# Patient Record
Sex: MALE | Race: WHITE | NOT HISPANIC OR LATINO | Employment: UNEMPLOYED | URBAN - NONMETROPOLITAN AREA
[De-identification: names, ages, dates, MRNs, and addresses within clinical notes are randomized per-mention and may not be internally consistent; named-entity substitution may affect disease eponyms.]

---

## 2017-01-23 ENCOUNTER — OFFICE VISIT (OUTPATIENT)
Dept: CARDIOLOGY | Facility: CLINIC | Age: 62
End: 2017-01-23

## 2017-01-23 VITALS
DIASTOLIC BLOOD PRESSURE: 80 MMHG | WEIGHT: 258 LBS | HEIGHT: 72 IN | HEART RATE: 64 BPM | SYSTOLIC BLOOD PRESSURE: 130 MMHG | BODY MASS INDEX: 34.95 KG/M2

## 2017-01-23 DIAGNOSIS — I25.9 IHD (ISCHEMIC HEART DISEASE): ICD-10-CM

## 2017-01-23 DIAGNOSIS — I47.29 PAROXYSMAL VT (HCC): ICD-10-CM

## 2017-01-23 DIAGNOSIS — I25.10 CORONARY ARTERY DISEASE INVOLVING NATIVE CORONARY ARTERY OF NATIVE HEART WITHOUT ANGINA PECTORIS: ICD-10-CM

## 2017-01-23 DIAGNOSIS — I51.9 LV DYSFUNCTION: Primary | ICD-10-CM

## 2017-01-23 DIAGNOSIS — Z72.0 TOBACCO ABUSE: ICD-10-CM

## 2017-01-23 DIAGNOSIS — R06.02 SHORTNESS OF BREATH: ICD-10-CM

## 2017-01-23 DIAGNOSIS — I20.9 ANGINA PECTORIS (HCC): ICD-10-CM

## 2017-01-23 DIAGNOSIS — I10 ESSENTIAL HYPERTENSION: ICD-10-CM

## 2017-01-23 PROBLEM — E03.9 HYPOTHYROID: Status: ACTIVE | Noted: 2017-01-23

## 2017-01-23 PROBLEM — K21.9 GERD (GASTROESOPHAGEAL REFLUX DISEASE): Status: ACTIVE | Noted: 2017-01-23

## 2017-01-23 PROCEDURE — 99214 OFFICE O/P EST MOD 30 MIN: CPT | Performed by: NURSE PRACTITIONER

## 2017-01-23 PROCEDURE — 93000 ELECTROCARDIOGRAM COMPLETE: CPT | Performed by: NURSE PRACTITIONER

## 2017-01-23 RX ORDER — SPIRONOLACTONE 25 MG/1
25 TABLET ORAL DAILY
Qty: 30 TABLET | Refills: 8 | Status: SHIPPED | OUTPATIENT
Start: 2017-01-23 | End: 2017-02-22

## 2017-01-23 RX ORDER — EZETIMIBE 10 MG/1
10 TABLET ORAL DAILY
Qty: 30 TABLET | Refills: 8 | Status: SHIPPED | OUTPATIENT
Start: 2017-01-23 | End: 2017-02-22

## 2017-01-23 RX ORDER — RANOLAZINE 1000 MG/1
1000 TABLET, EXTENDED RELEASE ORAL 2 TIMES DAILY
Qty: 60 TABLET | Refills: 8 | Status: SHIPPED | OUTPATIENT
Start: 2017-01-23 | End: 2017-01-30 | Stop reason: DRUGHIGH

## 2017-01-23 RX ORDER — SPIRONOLACTONE 25 MG/1
25 TABLET ORAL DAILY
COMMUNITY
End: 2017-01-23 | Stop reason: SDUPTHER

## 2017-01-23 RX ORDER — SOTALOL HYDROCHLORIDE 80 MG/1
80 TABLET ORAL 2 TIMES DAILY
Qty: 60 TABLET | Refills: 8 | Status: SHIPPED | OUTPATIENT
Start: 2017-01-23 | End: 2017-02-22

## 2017-01-23 RX ORDER — CLOPIDOGREL BISULFATE 75 MG/1
75 TABLET ORAL DAILY
COMMUNITY
End: 2017-01-23 | Stop reason: SDUPTHER

## 2017-01-23 RX ORDER — SOTALOL HYDROCHLORIDE 80 MG/1
80 TABLET ORAL 2 TIMES DAILY
COMMUNITY
End: 2017-01-23 | Stop reason: SDUPTHER

## 2017-01-23 RX ORDER — ATORVASTATIN CALCIUM 20 MG/1
20 TABLET, FILM COATED ORAL DAILY
Qty: 30 TABLET | Refills: 8 | Status: SHIPPED | OUTPATIENT
Start: 2017-01-23 | End: 2017-02-22

## 2017-01-23 RX ORDER — CLOPIDOGREL BISULFATE 75 MG/1
75 TABLET ORAL DAILY
Qty: 30 TABLET | Refills: 8 | Status: SHIPPED | OUTPATIENT
Start: 2017-01-23 | End: 2017-01-26 | Stop reason: ALTCHOICE

## 2017-01-23 RX ORDER — POTASSIUM CHLORIDE 20 MEQ/1
20 TABLET, EXTENDED RELEASE ORAL DAILY
COMMUNITY
End: 2017-08-15 | Stop reason: SDUPTHER

## 2017-01-23 RX ORDER — NITROGLYCERIN 0.4 MG/1
TABLET SUBLINGUAL
Qty: 25 TABLET | Refills: 2 | Status: SHIPPED | OUTPATIENT
Start: 2017-01-23 | End: 2017-05-12 | Stop reason: SDUPTHER

## 2017-01-23 RX ORDER — BUMETANIDE 1 MG/1
1 TABLET ORAL DAILY
COMMUNITY
End: 2017-08-15 | Stop reason: SDUPTHER

## 2017-01-23 RX ORDER — RANOLAZINE 500 MG/1
500 TABLET, EXTENDED RELEASE ORAL 2 TIMES DAILY
COMMUNITY
End: 2017-01-23 | Stop reason: DRUGHIGH

## 2017-01-23 RX ORDER — ATORVASTATIN CALCIUM 20 MG/1
20 TABLET, FILM COATED ORAL DAILY
COMMUNITY
End: 2017-01-23 | Stop reason: SDUPTHER

## 2017-01-23 RX ORDER — EZETIMIBE 10 MG/1
10 TABLET ORAL DAILY
COMMUNITY
End: 2017-01-23 | Stop reason: SDUPTHER

## 2017-01-23 RX ORDER — PANTOPRAZOLE SODIUM 40 MG/1
40 TABLET, DELAYED RELEASE ORAL DAILY
COMMUNITY

## 2017-01-23 NOTE — MR AVS SNAPSHOT
Roberth Kearns   1/23/2017 2:00 PM   Office Visit    Dept Phone:  [phone]   Encounter #:  [id number]    Provider:  MIGUELITO Chamorro   Department:  [facility] CARDIOLOGY                Your Full Care Plan              Today's Medication Changes          These changes are accurate as of: 1/23/17  3:01 PM.  If you have any questions, ask your nurse or doctor.               Medication(s)that have changed:     nitroglycerin 0.4 MG SL tablet   Commonly known as:  NITROSTAT   1 under the tongue as needed for angina, may repeat q5mins for up three doses   What changed:  Another medication with the same name was removed. Continue taking this medication, and follow the directions you see here.   Changed by:  MIGUELITO Chamorro       ranolazine 1000 MG 12 hr tablet   Commonly known as:  RANEXA   Take 1 tablet by mouth 2 (Two) Times a Day for 30 days.   What changed:    - medication strength  - how much to take   Changed by:  MIGUELITO Chamorro            Where to Get Your Medications      These medications were sent to [address] [phone] [place] [id number] [address] [address]     Phone:  [phone]     atorvastatin 20 MG tablet    clopidogrel 75 MG tablet    ezetimibe 10 MG tablet    nitroglycerin 0.4 MG SL tablet    ranolazine 1000 MG 12 hr tablet    sotalol 80 MG tablet    spironolactone 25 MG tablet                  Your Updated Medication List          This list is accurate as of: 1/23/17  3:01 PM.  Always use your most recent med list.                amLODIPine 10 MG tablet   Commonly known as:  NORVASC   TAKE 1 TABLET EACH DAY       aspirin 81 MG EC tablet       atorvastatin 20 MG tablet   Commonly known as:  LIPITOR   Take 1 tablet by mouth Daily for 30 days.       bumetanide 1 MG tablet   Commonly known as:  BUMEX       clopidogrel 75 MG tablet   Commonly known as:  PLAVIX    Take 1 tablet by mouth Daily for 30 days.       ezetimibe 10 MG tablet   Commonly known as:  ZETIA   Take 1 tablet by mouth Daily for 30 days.       fish oil 1000 MG capsule capsule       HYDROcodone-acetaminophen  MG per tablet   Commonly known as:  NORCO       levothyroxine 75 MCG tablet   Commonly known as:  SYNTHROID, LEVOTHROID       nitroglycerin 0.4 MG SL tablet   Commonly known as:  NITROSTAT   1 under the tongue as needed for angina, may repeat q5mins for up three doses       pantoprazole 40 MG EC tablet   Commonly known as:  PROTONIX       potassium chloride 20 MEQ CR tablet   Commonly known as:  K-DUR,KLOR-CON       ranolazine 1000 MG 12 hr tablet   Commonly known as:  RANEXA   Take 1 tablet by mouth 2 (Two) Times a Day for 30 days.       sotalol 80 MG tablet   Commonly known as:  BETAPACE   Take 1 tablet by mouth 2 (Two) Times a Day for 30 days.       spironolactone 25 MG tablet   Commonly known as:  ALDACTONE   Take 1 tablet by mouth Daily for 30 days.       trandolapril 4 MG tablet   Commonly known as:  MAVIK   TAKE ONE TABLET ONCE DAILY               We Performed the Following     ECG 12 Lead       You Were Diagnosed With        Codes Comments    LV dysfunction    -  Primary ICD-10-CM: I51.9  ICD-9-CM: 429.9     Coronary artery disease involving native coronary artery of native heart without angina pectoris     ICD-10-CM: I25.10  ICD-9-CM: 414.01     Essential hypertension     ICD-10-CM: I10  ICD-9-CM: 401.9     Tobacco abuse     ICD-10-CM: Z72.0  ICD-9-CM: 305.1     Paroxysmal VT     ICD-10-CM: I47.2  ICD-9-CM: 427.1     Shortness of breath     ICD-10-CM: R06.02  ICD-9-CM: 786.05     Angina pectoris     ICD-10-CM: I20.9  ICD-9-CM: 413.9       Instructions     None    Patient Instructions History      Upcoming Appointments     Visit Type Date Time Department    FOLLOW UP 1/23/2017  2:00 PM [facility]    FOLLOW UP 7/25/2017  1:45 PM [facility] Watson Signup     Our  "records indicate that you have declined [facility] signup. If you would like to sign up for [employer], please email [email or web address] or call [phone] to obtain an activation code.             Other Info from Your Visit           Your Appointments     Jul 25, 2017  1:45 PM EDT   Follow Up with MIGUELITO Sanford   [facility] CARDIOLOGY (--)    [address] [place] [address] [phone]           Arrive 15 minutes prior to appointment.              Allergies     No Known Allergies      Reason for Visit     Follow-up Patient states having some dull pain to left chest area that comes and goes, he states notes more with diet, things that is high in sodium.     Dizziness Has occ dizziness while lying in bed.    Shortness of Breath He states is about the same, nothing new.  PCP manages labs.     Med Refill Needs refills on cardiac medication.      Vital Signs     Blood Pressure Pulse Height Weight Body Mass Index Smoking Status    130/80 (BP Location: Left arm) 64 72\" (182.9 cm) 258 lb (117 kg) 34.99 kg/m2 Current Every Day Smoker      Problems and Diagnoses Noted     Coronary heart disease    Acid reflux disease    High blood pressure    Underactive thyroid    LV dysfunction    Tobacco abuse        Fast heart beat        Shortness of breath        Chest pain due to insufficient blood supply to heart            "

## 2017-01-23 NOTE — PROGRESS NOTES
Chief Complaint   Patient presents with   • Follow-up     Patient states having some dull pain to left chest area that comes and goes, he states notes more with diet, things that is high in sodium.    • Dizziness     Has occ dizziness while lying in bed.   • Shortness of Breath     He states is about the same, nothing new.  PCP manages labs.    • Med Refill     Needs refills on cardiac medication.       Cardiac Complaints  dyspnea and Dizziness      Subjective   Roberth Kearns is a 61 y.o. male with a history of ischemic heart disease diagnosed in 2001 when he underwent bypass surgery followed by multiple stents with redo bypass surgery. His last stent was placed in May 2013. His Lexiscan stress test in 2015 showed a fixed defect but no ischemia. Ejection fraction at that time was 45-50%. In March of 2016, cardiac catheterization was done that showed ejection fraction was 35-40%. The grafts to the LAD and obtuse marginal were patent. The graft to the PDA was occluded. Disease of the graft to the acute marginal was noted with stent placement.  He returns today for follow up and denies any new cardiac concerns.  He does continue to have some shortness of breath, dull chest discomfort, and he continues to have dizziness while lying in bed.  Labs he reports with you, no copies available.  Cardiac refills requested.      Cardiac History  Past Surgical History   Procedure Laterality Date   • Coronary artery bypass graft  04/2001     CABG-LIMA to LAD, SVG to RCA.   • Cath lab procedure  06/2001     Cath-[place] in OM   • Cath lab procedure  01/2002     Cath-Brachy Therapy of , Stent in RCA.   • Coronary artery bypass graft  09/18/2002     Redo CABG-SVG to distal OM, SVG to PDA.   • Cath lab procedure  08/26/2004     Cath-Patent Grafts-85%LCX at [facility].   • Echo - converted  06/18/2007     Echo-EF 45, Lateral WMA.   • Cath lab procedure  06/18/2007     Cath-Same Findings. PTCA of OM.   • Converted  (historical) holter  06/28/2007     Holter-AVG HR 63 bpm, tow (3beats) runs of V-tach.   • Cardiovascular stress test  07/22/2008     Stress- 6Min, 72%THR. Anterol Lateral Infarct with [place] Infarct Ischemia.   • Echo - converted  11/03/2008     Echo-EF 45-49% Trace MR and MVP.   • Echo - converted  01/24/2013     Echo-EF 45%   • Cardiovascular stress test  01/24/2013     L. Myoview-EF 40%. AnteroLateral Infarct with PerInfarct Ischemia.   • Cath lab procedure  05/10/2013     Cath-90% SVG to OM-5.0 x 8 mm BMS.   • Converted (historical) holter  07/15/2013     Holter-AVGHR 56 BPM. 2 runs of V-tach.   • Echo - converted  09/23/2014     Echo-([facility]) EF 50%. Septal WMA.   • Cardiovascular stress test  09/22/2015     Lexiscan stress-mod fixed defect   • Echo - converted  09/22/2015     Echo-EF 45-50%, RVSP 15 mmHg, mod MR.   • Coronary angioplasty with stent placement  03/25/2016     SVG to right marginal 2.25x12 stent mid graft and 2.5x9 stent ostium; SVG to OM 3.5x8 stent   • Echo - converted  03/25/2016     EF 40%       Current Outpatient Prescriptions   Medication Sig Dispense Refill   • amLODIPine (NORVASC) 10 MG tablet TAKE 1 TABLET EACH DAY 30 tablet 6   • aspirin 81 MG EC tablet Take 81 mg by mouth daily. Decrease dose to once a day with Effient     • atorvastatin (LIPITOR) 20 MG tablet Take 1 tablet by mouth Daily for 30 days. 30 tablet 8   • bumetanide (BUMEX) 1 MG tablet Take 1 mg by mouth Daily.     • clopidogrel (PLAVIX) 75 MG tablet Take 1 tablet by mouth Daily for 30 days. 30 tablet 8   • ezetimibe (ZETIA) 10 MG tablet Take 1 tablet by mouth Daily for 30 days. 30 tablet 8   • HYDROcodone-acetaminophen (NORCO)  MG per tablet Take 1 tablet by mouth 4 (Four) Times a Day.     • levothyroxine (SYNTHROID, LEVOTHROID) 75 MCG tablet Take 75 mcg by mouth daily.     • nitroglycerin (NITROSTAT) 0.4 MG SL tablet 1 under the tongue as needed for angina, may repeat q5mins for up three doses 25 tablet 2   • Omega-3  Fatty Acids (FISH OIL) 1000 MG capsule capsule Take  by mouth 2 (two) times a day.     • pantoprazole (PROTONIX) 40 MG EC tablet Take 40 mg by mouth Daily.     • potassium chloride (K-DUR,KLOR-CON) 20 MEQ CR tablet Take 20 mEq by mouth Daily.     • sotalol (BETAPACE) 80 MG tablet Take 1 tablet by mouth 2 (Two) Times a Day for 30 days. 60 tablet 8   • spironolactone (ALDACTONE) 25 MG tablet Take 1 tablet by mouth Daily for 30 days. 30 tablet 8   • trandolapril (MAVIK) 4 MG tablet TAKE ONE TABLET ONCE DAILY 30 tablet 6   • ranolazine (RANEXA) 1000 MG 12 hr tablet Take 1 tablet by mouth 2 (Two) Times a Day for 30 days. 60 tablet 8     No current facility-administered medications for this visit.        Review of patient's allergies indicates no known allergies.    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     Social History Main Topics   • Smoking status: Current Every Day Smoker     Packs/day: 1.00     Years: 40.00   • Smokeless tobacco: Never Used   • Alcohol use Yes      Comment: social   • Drug use: No   • Sexual activity: Not on file     Other Topics Concern   • Not on file     Social History Narrative       Family History   Problem Relation Age of Onset   • Hypertension Mother    • Cancer Father    • Heart disease Other    • Hyperlipidemia Other    • Hypertension Other    • Hypertension Other        Review of Systems   Constitutional: Negative.    HENT: Negative.    Respiratory:  "Positive for shortness of breath.    Cardiovascular: Positive for chest pain.   Gastrointestinal: Negative.    Skin: Negative.    Neurological: Positive for dizziness.   Psychiatric/Behavioral: Negative.        DiabetesNo  Thyroidnormal    Objective     Visit Vitals   • /80 (BP Location: Left arm)   • Pulse 64   • Ht 72\" (182.9 cm)   • Wt 258 lb (117 kg)   • BMI 34.99 kg/m2       Physical Exam   Constitutional: He is oriented to person, place, and time. He appears well-developed and well-nourished.   HENT:   Head: Normocephalic and atraumatic.   Eyes: EOM are normal. Pupils are equal, round, and reactive to light.   Neck: Normal range of motion.   Cardiovascular: Normal rate and regular rhythm.    Murmur heard.  Pulmonary/Chest: Effort normal and breath sounds normal.   Abdominal: Soft.   Musculoskeletal: Normal range of motion.   Neurological: He is alert and oriented to person, place, and time.   Skin: Skin is warm and dry.   Psychiatric: He has a normal mood and affect.         ECG 12 Lead  Date/Time: 1/23/2017 2:56 PM  Performed by: SUNG MARQUIS  Authorized by: SUNG MARQUIS   Rhythm: sinus rhythm  BPM: 67  Clinical impression: abnormal ECG  Comments: Inverted p wave, t wave (normal QT)            Assessment/Plan     HR and BP are both stable today.  EKG done for paroxysmal VT and sotalol therapy showed a sinus rhythm with some beats with inverted p wave, t wave and normal QT.  We will continue with current dosing of sotalol 80mg BID.  Repeat cardiac workup advised with stress/echo advised since he does continue to have shortness of breath/chest pain, which he reports as worse than prior over the last two weeks.  It should be noted he was without plavix for about a week.  Labs he states with you, no copy available.  Cardiac refills sent, patient advised to increase the dose of ranexa to 1000mg BID as long has he can tolerate it.  6 month follow up advised or sooner if needed.  Smoking cessation " advised once again, at least 3 minutes spent discussing benefits and modalities to quit.      Problems Addressed this Visit        Cardiovascular and Mediastinum    LV dysfunction - Primary    Relevant Medications    ranolazine (RANEXA) 1000 MG 12 hr tablet    nitroglycerin (NITROSTAT) 0.4 MG SL tablet    sotalol (BETAPACE) 80 MG tablet    clopidogrel (PLAVIX) 75 MG tablet    Other Relevant Orders    ECG 12 Lead    CAD (coronary artery disease)    Relevant Medications    ranolazine (RANEXA) 1000 MG 12 hr tablet    nitroglycerin (NITROSTAT) 0.4 MG SL tablet    sotalol (BETAPACE) 80 MG tablet    clopidogrel (PLAVIX) 75 MG tablet    Other Relevant Orders    Adult Transthoracic Echo Complete    Stress Test With Myocardial Perfusion One Day    HTN (hypertension)    Relevant Medications    bumetanide (BUMEX) 1 MG tablet    sotalol (BETAPACE) 80 MG tablet    spironolactone (ALDACTONE) 25 MG tablet      Other Visit Diagnoses     Tobacco abuse        Relevant Orders    Adult Transthoracic Echo Complete    Stress Test With Myocardial Perfusion One Day    Paroxysmal VT        Relevant Medications    ranolazine (RANEXA) 1000 MG 12 hr tablet    nitroglycerin (NITROSTAT) 0.4 MG SL tablet    sotalol (BETAPACE) 80 MG tablet    clopidogrel (PLAVIX) 75 MG tablet    Other Relevant Orders    ECG 12 Lead    Shortness of breath        Relevant Orders    Adult Transthoracic Echo Complete    Stress Test With Myocardial Perfusion One Day    Angina pectoris        Relevant Medications    ranolazine (RANEXA) 1000 MG 12 hr tablet    nitroglycerin (NITROSTAT) 0.4 MG SL tablet    sotalol (BETAPACE) 80 MG tablet    clopidogrel (PLAVIX) 75 MG tablet    Other Relevant Orders    Adult Transthoracic Echo Complete    Stress Test With Myocardial Perfusion One Day                  Electronically signed by MIGUELITO Herrmann January 23, 2017 5:14 PM

## 2017-01-23 NOTE — LETTER
January 23, 2017     MIGUELITO Gavin  [address]  [facility] [address]    Patient: Roberth Kearns   YOB: 1955   Date of Visit: 1/23/2017       Dear MIGUELITO Diaz:    Roberth Kearns was in my office today. Below are the relevant portions of my assessment and plan of care.        HR and BP are both stable today.  EKG done for paroxysmal VT and sotalol therapy showed a sinus rhythm with some beats with inverted p wave, t wave and normal QT.  We will continue with current dosing of sotalol 80mg BID.  Repeat cardiac workup advised with stress/echo advised since he does continue to have shortness of breath/chest pain, which he reports as worse than prior over the last two weeks.  It should be noted he was without plavix for about a week.  Labs he states with you, no copy available.  Cardiac refills sent, patient advised to increase the dose of ranexa to 1000mg BID as long has he can tolerate it.  6 month follow up advised or sooner if needed.  Smoking cessation advised once again, at least 3 minutes spent discussing benefits and modalities to quit.      Problems Addressed this Visit        Cardiovascular and Mediastinum    LV dysfunction - Primary    Relevant Medications    ranolazine (RANEXA) 1000 MG 12 hr tablet    nitroglycerin (NITROSTAT) 0.4 MG SL tablet    sotalol (BETAPACE) 80 MG tablet    clopidogrel (PLAVIX) 75 MG tablet    Other Relevant Orders    ECG 12 Lead    CAD (coronary artery disease)    Relevant Medications    ranolazine (RANEXA) 1000 MG 12 hr tablet    nitroglycerin (NITROSTAT) 0.4 MG SL tablet    sotalol (BETAPACE) 80 MG tablet    clopidogrel (PLAVIX) 75 MG tablet    Other Relevant Orders    Adult Transthoracic Echo Complete    Stress Test With Myocardial Perfusion One Day    HTN (hypertension)    Relevant Medications    bumetanide (BUMEX) 1 MG tablet    sotalol (BETAPACE) 80 MG tablet    spironolactone (ALDACTONE) 25 MG tablet      Other Visit Diagnoses     Tobacco abuse         Relevant Orders    Adult Transthoracic Echo Complete    Stress Test With Myocardial Perfusion One Day    Paroxysmal VT        Relevant Medications    ranolazine (RANEXA) 1000 MG 12 hr tablet    nitroglycerin (NITROSTAT) 0.4 MG SL tablet    sotalol (BETAPACE) 80 MG tablet    clopidogrel (PLAVIX) 75 MG tablet    Other Relevant Orders    ECG 12 Lead    Shortness of breath        Relevant Orders    Adult Transthoracic Echo Complete    Stress Test With Myocardial Perfusion One Day    Angina pectoris        Relevant Medications    ranolazine (RANEXA) 1000 MG 12 hr tablet    nitroglycerin (NITROSTAT) 0.4 MG SL tablet    sotalol (BETAPACE) 80 MG tablet    clopidogrel (PLAVIX) 75 MG tablet    Other Relevant Orders    Adult Transthoracic Echo Complete    Stress Test With Myocardial Perfusion One Day                  Electronically signed by MIGUELITO Herrmann January 23, 2017 5:14 PM              If you have questions, please do not hesitate to call me. I look forward to following Roberth along with you.         Sincerely,        MIGUELITO Herrmann        CC: No Recipients

## 2017-01-26 ENCOUNTER — TELEPHONE (OUTPATIENT)
Dept: CARDIOLOGY | Facility: CLINIC | Age: 62
End: 2017-01-26

## 2017-01-26 NOTE — TELEPHONE ENCOUNTER
Pt states he is having a lot more chest pain since increasing the Ranexa to 1000 mg BID, asking if he can decrease back to 500 mg BID. Also said we had changed his Plavix to Effient 10 mg QD a long time ago but still had Plavix in the chart.Will update chart, is it ok to send refill for the Effient also?

## 2017-01-27 RX ORDER — PRASUGREL 10 MG/1
10 TABLET, FILM COATED ORAL DAILY
Qty: 30 TABLET | Refills: 9 | Status: SHIPPED | OUTPATIENT
Start: 2017-01-27 | End: 2017-06-16 | Stop reason: SDUPTHER

## 2017-01-30 RX ORDER — RANOLAZINE 500 MG/1
500 TABLET, EXTENDED RELEASE ORAL 2 TIMES DAILY
Qty: 60 TABLET | Refills: 9 | Status: SHIPPED | OUTPATIENT
Start: 2017-01-30 | End: 2017-08-15 | Stop reason: DRUGHIGH

## 2017-05-12 RX ORDER — NITROGLYCERIN 0.4 MG/1
TABLET SUBLINGUAL
Qty: 25 TABLET | Refills: 2 | Status: SHIPPED | OUTPATIENT
Start: 2017-05-12 | End: 2017-08-15 | Stop reason: SDUPTHER

## 2017-06-16 RX ORDER — PRASUGREL HCL 10 MG
TABLET ORAL
Qty: 30 TABLET | Refills: 5 | Status: SHIPPED | OUTPATIENT
Start: 2017-06-16 | End: 2017-08-15 | Stop reason: SDUPTHER

## 2017-06-16 RX ORDER — POTASSIUM CHLORIDE 20 MEQ/1
TABLET, EXTENDED RELEASE ORAL
Qty: 30 TABLET | OUTPATIENT
Start: 2017-06-16

## 2017-06-16 RX ORDER — PANTOPRAZOLE SODIUM 40 MG/1
TABLET, DELAYED RELEASE ORAL
Qty: 30 TABLET | OUTPATIENT
Start: 2017-06-16

## 2017-06-26 RX ORDER — EZETIMIBE 10 MG/1
TABLET ORAL
Qty: 30 TABLET | OUTPATIENT
Start: 2017-06-26

## 2017-06-26 RX ORDER — TRANDOLAPRIL TABLETS 4 MG/1
TABLET ORAL
Qty: 30 TABLET | Refills: 0 | Status: SHIPPED | OUTPATIENT
Start: 2017-06-26 | End: 2017-07-27 | Stop reason: SDUPTHER

## 2017-07-05 RX ORDER — BUMETANIDE 1 MG/1
TABLET ORAL
Qty: 30 TABLET | OUTPATIENT
Start: 2017-07-05

## 2017-07-18 ENCOUNTER — TELEPHONE (OUTPATIENT)
Dept: CARDIOLOGY | Facility: CLINIC | Age: 62
End: 2017-07-18

## 2017-07-18 RX ORDER — AMLODIPINE BESYLATE 10 MG/1
10 TABLET ORAL DAILY
Qty: 30 TABLET | Refills: 0 | Status: SHIPPED | OUTPATIENT
Start: 2017-07-18 | End: 2017-08-15 | Stop reason: SDUPTHER

## 2017-07-18 NOTE — TELEPHONE ENCOUNTER
Patient called requesting refill on Amlodipine 10mg daily, refill on Amlodipine 10mg daily 30 day refill sent to pharmacy.

## 2017-07-27 ENCOUNTER — TELEPHONE (OUTPATIENT)
Dept: CARDIOLOGY | Facility: CLINIC | Age: 62
End: 2017-07-27

## 2017-07-27 RX ORDER — TRANDOLAPRIL TABLETS 4 MG/1
4 TABLET ORAL DAILY
Qty: 30 TABLET | Refills: 0 | Status: SHIPPED | OUTPATIENT
Start: 2017-07-27 | End: 2017-08-15 | Stop reason: SDUPTHER

## 2017-07-27 NOTE — TELEPHONE ENCOUNTER
Patient called requesting refill on Trandolapril 4mg once daily. 30 day refill on Trandolapril 4mg daily sent to pharmacy.

## 2017-08-14 NOTE — PROGRESS NOTES
"Chief Complaint   Patient presents with   • Follow-up     6 month follow-up, labs per PCP,    • Med Refill     request 90 day refills on cardiac medication's, patient brought in copy of medication  list with visit.   • Chest Pain     \"had chest pain last few days, have started walking and would have chest pain next day\"        Subjective       Roberth Kearns is a 61 y.o. male with ischemic heart disease diagnosed in 2001 when he underwent bypass surgery followed by redo bypass surgery and multiple stentings with the last in March of 2016 when he had two stents placed in the SVG to right marginal and one stent to SVG to OM after being admitted with NSTEMI.  Last follow up visit in January, he continued to have chest discomfort.  Ranexa was increased to 1000 mg BID and he was advised to undergo stress testing.  He had some problems with insurance and was unable to complete testing.  He came in today for his regular follow up visit contnuing to have chest discomfort.  He started exercising recently, walking about 1/4 mile daily and has noticed chest tightness and sharp pain under his left breast after walking  The pain lasts a few minutes and is relieved with rest and SL nitro x 1.  He states pain is similar to what he had before his last stenting. He does have some shortness of breath but not worse than before.  He denies any significant palpitations.  He unfortunately still continues to smoke about a pack a day and doesn't think he can quit.  Lab work was apparently done by his primary care about one month ago.  He requests refills on all cardiac meds.      HPI         Cardiac History:    Past Surgical History:   Procedure Laterality Date   • CARDIAC CATHETERIZATION  06/18/2007    Cath-Same Findings. PTCA of .   • CARDIAC CATHETERIZATION  08/26/2004    Cath-Patent Grafts-85%LCX at [facility]   • CARDIAC CATHETERIZATION  01/2002    Cath-Brachy Therapy of , Stent in RCA.   • CARDIAC CATHETERIZATION  " 06/2001    Cath-Oconnor in OM   • CARDIAC CATHETERIZATION  03/25/2016    SVG to right marginal 2.25x12 stent mid graft and 2.5x9 stent ostium; SVG to OM 3.5x8 stent   • CARDIOVASCULAR STRESS TEST  07/22/2008    Stress- 6Min, 72%THR. Anterol Lateral Infarct with Yuni Infarct Ischemia.   • CARDIOVASCULAR STRESS TEST  01/24/2013    L. Myoview-EF 40%. AnteroLateral Infarct with PerInfarct Ischemia.   • CARDIOVASCULAR STRESS TEST  09/22/2015    Lexiscan stress-mod fixed defect   • CATH LAB PROCEDURE  05/10/2013    Cath-90% SVG to OM-5.0 x 8 mm BMS.   • CONVERTED (HISTORICAL) HOLTER  06/28/2007    Holter-AVG HR 63 bpm, tow (3beats) runs of V-tach.   • CONVERTED (HISTORICAL) HOLTER  07/15/2013    Holter-AVGHR 56 BPM. 2 runs of V-tach.   • CORONARY ARTERY BYPASS GRAFT  04/2001    CABG-LIMA to LAD, SVG to RCA.   • CORONARY ARTERY BYPASS GRAFT  09/18/2002    Redo CABG-SVG to distal OM, SVG to PDA.   • ECHO - CONVERTED  06/18/2007    Echo-EF 45, Lateral WMA.   • ECHO - CONVERTED  11/03/2008    Echo-EF 45-49% Trace MR and MVP.   • ECHO - CONVERTED  01/24/2013    Echo-EF 45%   • ECHO - CONVERTED  09/23/2014    Echo-([facility]) EF 50%. Septal WMA.   • ECHO - CONVERTED  09/22/2015    Echo-EF 45-50%, RVSP 15 mmHg, mod MR.   • ECHO - CONVERTED  03/25/2016    EF 40%       Current Outpatient Prescriptions   Medication Sig Dispense Refill   • amLODIPine (NORVASC) 10 MG tablet Take 1 tablet by mouth Daily. 30 tablet 0   • aspirin 81 MG EC tablet Take 81 mg by mouth daily. Decrease dose to once a day with Effient     • atorvastatin (LIPITOR) 20 MG tablet Take 1 tablet by mouth Daily. 30 tablet 6   • bumetanide (BUMEX) 1 MG tablet Take 1 tablet by mouth Daily. 30 tablet 6   • HYDROcodone-acetaminophen (NORCO)  MG per tablet Take 1 tablet by mouth 4 (Four) Times a Day.     • levothyroxine (SYNTHROID, LEVOTHROID) 75 MCG tablet Take 75 mcg by mouth daily.     • metFORMIN (GLUCOPHAGE) 500 MG tablet Take 500 mg by mouth 2 (Two) Times a Day With  Meals.     • nitroglycerin (NITROSTAT) 0.4 MG SL tablet Place 1 tablet under the tongue Every 5 (Five) Minutes As Needed for Chest Pain. Take no more than 3 doses in 15 minutes. 25 tablet 2   • Omega-3 Fatty Acids (FISH OIL) 1000 MG capsule capsule Take  by mouth 2 (two) times a day.     • pantoprazole (PROTONIX) 40 MG EC tablet Take 40 mg by mouth Daily.     • potassium chloride (K-DUR,KLOR-CON) 20 MEQ CR tablet Take 1 tablet by mouth Daily. 30 tablet 6   • prasugrel (EFFIENT) 10 MG tablet Take 1 tablet by mouth Daily. 30 tablet 6   • ranolazine (RANEXA) 1000 MG 12 hr tablet Take 1 tablet by mouth 2 (Two) Times a Day. 60 tablet 6   • sotalol (BETAPACE) 80 MG tablet Take 1 tablet by mouth 2 (Two) Times a Day. 60 tablet 6   • spironolactone (ALDACTONE) 25 MG tablet Take 1 tablet by mouth Daily. 30 tablet 6   • trandolapril (MAVIK) 4 MG tablet Take 1 tablet by mouth Daily. 30 tablet 0     No current facility-administered medications for this visit.        Review of patient's allergies indicates no known allergies.    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)     mild   • Shortness of breath    • V tach     PVC, on Sotalol       Social History     Social History   • Marital status:      Spouse name: N/A   • Number of children: N/A   • Years of education: N/A     Occupational History   • Not on file.     Social History Main Topics   • Smoking status: Current Every Day Smoker     Packs/day: 1.00     Years: 40.00   • Smokeless tobacco: Never Used      Comment: counseled patient on effect's of tobacco use on health.   • Alcohol use Yes      Comment: social   • Drug use: No   • Sexual activity: Not on file     Other Topics Concern   •  "Not on file     Social History Narrative       Family History   Problem Relation Age of Onset   • Hypertension Mother    • Cancer Father    • Heart disease Other    • Hyperlipidemia Other    • Hypertension Other    • Hypertension Other        Review of Systems   Constitution: Positive for malaise/fatigue. Negative for chills, decreased appetite and fever.   Cardiovascular: Positive for chest pain and dyspnea on exertion. Negative for leg swelling and palpitations.   Respiratory: Positive for shortness of breath. Negative for cough and wheezing.    Hematologic/Lymphatic: Negative for bleeding problem. Does not bruise/bleed easily.   Musculoskeletal: Positive for arthritis.   Gastrointestinal: Negative for abdominal pain, heartburn and nausea.   Psychiatric/Behavioral: Negative for altered mental status. The patient has insomnia. The patient is not nervous/anxious.         Diabetes- Yes  Thyroid-abnormal    Objective     /78 (BP Location: Left arm)  Pulse 60  Ht 72\" (182.9 cm)  Wt 256 lb (116 kg)  BMI 34.72 kg/m2    Physical Exam   Constitutional: He is oriented to person, place, and time. He appears well-developed and well-nourished. No distress.   HENT:   Head: Normocephalic and atraumatic.   Eyes: Pupils are equal, round, and reactive to light.   Neck: Normal range of motion. Neck supple.   Cardiovascular: Normal rate and regular rhythm.    Murmur heard.  Pulmonary/Chest: Effort normal and breath sounds normal. No respiratory distress. He has no wheezes.   Abdominal: Soft. Bowel sounds are normal.   Musculoskeletal: Normal range of motion. He exhibits no edema.   Neurological: He is alert and oriented to person, place, and time.   Skin: Skin is warm and dry. There is pallor.   Psychiatric: He has a normal mood and affect. His behavior is normal.   Nursing note and vitals reviewed.           ECG 12 Lead  Date/Time: 8/15/2017 5:20 PM  Performed by: CLARISSA BARRAGAN  Authorized by: CLARISSA BARRAGAN   Comparison: " compared with previous ECG from 1/23/2017  Rhythm: sinus bradycardia and A-V block  Rate: bradycardic  Conduction: 1st degree  Clinical impression: abnormal ECG  Comments: Normal QTc at 428 ms                  Assessment/Plan      His heart rate and blood pressure are stable today.  His EKG showed sinus efrain with first degree AV block and normal QTc.  He is advised to continue the same medications.  Refills were sent.  Labs were requested from Dr. Lobo's office for evaluation of blood count, renal function, electrolytes, especially potassium, as well as lipids.  We discussed smoking cessation in detail and patient does not feel he can quit and refused any smoking cessation tools.  His chest pain is a little concerning and now that his insurance is working, he has agreed to undergo stress testing in the form of Lexiscan to look for ischemic burden.  I also scheduled him for an echocardiogram to evaluate the LV function.  He has nitro sl on hand and is encouraged to use for CP.  If CP is not relieved by nitro, he is advised to present to the nearest emergency room.  Based on the results of the stress and echo, further recommendations will be made.  A six month follow up visit will be scheduled.          Roberth was seen today for follow-up, med refill and chest pain.    Diagnoses and all orders for this visit:    Ischemic chest pain  -     Stress Test With Myocardial Perfusion One Day; Future  -     Adult Transthoracic Echo Complete; Future  -     ECG 12 Lead    Coronary artery disease of native heart with stable angina pectoris, unspecified vessel or lesion type  -     Stress Test With Myocardial Perfusion One Day; Future  -     Adult Transthoracic Echo Complete; Future  -     ECG 12 Lead    Essential hypertension  -     Stress Test With Myocardial Perfusion One Day; Future  -     Adult Transthoracic Echo Complete; Future  -     ECG 12 Lead    LV dysfunction  -     Stress Test With Myocardial Perfusion One Day;  Future  -     Adult Transthoracic Echo Complete; Future    Pure hypercholesterolemia  -     Stress Test With Myocardial Perfusion One Day; Future  -     Adult Transthoracic Echo Complete; Future    Other orders  -     amLODIPine (NORVASC) 10 MG tablet; Take 1 tablet by mouth Daily.  -     atorvastatin (LIPITOR) 20 MG tablet; Take 1 tablet by mouth Daily.  -     bumetanide (BUMEX) 1 MG tablet; Take 1 tablet by mouth Daily.  -     prasugrel (EFFIENT) 10 MG tablet; Take 1 tablet by mouth Daily.  -     nitroglycerin (NITROSTAT) 0.4 MG SL tablet; Place 1 tablet under the tongue Every 5 (Five) Minutes As Needed for Chest Pain. Take no more than 3 doses in 15 minutes.  -     potassium chloride (K-DUR,KLOR-CON) 20 MEQ CR tablet; Take 1 tablet by mouth Daily.  -     ranolazine (RANEXA) 1000 MG 12 hr tablet; Take 1 tablet by mouth 2 (Two) Times a Day.  -     sotalol (BETAPACE) 80 MG tablet; Take 1 tablet by mouth 2 (Two) Times a Day.  -     spironolactone (ALDACTONE) 25 MG tablet; Take 1 tablet by mouth Daily.  -     trandolapril (MAVIK) 4 MG tablet; Take 1 tablet by mouth Daily.                 Electronically signed by MIGUELITO Bryant,  August 25, 2017 12:06 PM

## 2017-08-15 ENCOUNTER — OFFICE VISIT (OUTPATIENT)
Dept: CARDIOLOGY | Facility: CLINIC | Age: 62
End: 2017-08-15

## 2017-08-15 VITALS
HEIGHT: 72 IN | DIASTOLIC BLOOD PRESSURE: 78 MMHG | WEIGHT: 256 LBS | HEART RATE: 60 BPM | SYSTOLIC BLOOD PRESSURE: 130 MMHG | BODY MASS INDEX: 34.67 KG/M2

## 2017-08-15 DIAGNOSIS — I20.9 ISCHEMIC CHEST PAIN (HCC): Primary | ICD-10-CM

## 2017-08-15 DIAGNOSIS — I51.9 LV DYSFUNCTION: ICD-10-CM

## 2017-08-15 DIAGNOSIS — I25.118 CORONARY ARTERY DISEASE OF NATIVE HEART WITH STABLE ANGINA PECTORIS, UNSPECIFIED VESSEL OR LESION TYPE (HCC): ICD-10-CM

## 2017-08-15 DIAGNOSIS — E78.00 PURE HYPERCHOLESTEROLEMIA: ICD-10-CM

## 2017-08-15 DIAGNOSIS — I10 ESSENTIAL HYPERTENSION: ICD-10-CM

## 2017-08-15 PROCEDURE — 93000 ELECTROCARDIOGRAM COMPLETE: CPT | Performed by: NURSE PRACTITIONER

## 2017-08-15 PROCEDURE — 99214 OFFICE O/P EST MOD 30 MIN: CPT | Performed by: NURSE PRACTITIONER

## 2017-08-15 RX ORDER — SPIRONOLACTONE 25 MG/1
25 TABLET ORAL DAILY
Qty: 30 TABLET | Refills: 6 | Status: SHIPPED | OUTPATIENT
Start: 2017-08-15 | End: 2019-08-21 | Stop reason: DRUGHIGH

## 2017-08-15 RX ORDER — SOTALOL HYDROCHLORIDE 80 MG/1
80 TABLET ORAL 2 TIMES DAILY
COMMUNITY
End: 2017-08-15 | Stop reason: SDUPTHER

## 2017-08-15 RX ORDER — POTASSIUM CHLORIDE 20 MEQ/1
20 TABLET, EXTENDED RELEASE ORAL DAILY
Qty: 30 TABLET | Refills: 6 | Status: SHIPPED | OUTPATIENT
Start: 2017-08-15 | End: 2019-11-12 | Stop reason: ALTCHOICE

## 2017-08-15 RX ORDER — BUMETANIDE 1 MG/1
1 TABLET ORAL DAILY
Qty: 30 TABLET | Refills: 6 | Status: SHIPPED | OUTPATIENT
Start: 2017-08-15 | End: 2019-05-02 | Stop reason: ALTCHOICE

## 2017-08-15 RX ORDER — AMLODIPINE BESYLATE 10 MG/1
10 TABLET ORAL DAILY
Qty: 30 TABLET | Refills: 0 | Status: SHIPPED | OUTPATIENT
Start: 2017-08-15 | End: 2017-09-18 | Stop reason: SDUPTHER

## 2017-08-15 RX ORDER — ATORVASTATIN CALCIUM 20 MG/1
20 TABLET, FILM COATED ORAL DAILY
COMMUNITY
End: 2017-08-15 | Stop reason: SDUPTHER

## 2017-08-15 RX ORDER — RANOLAZINE 1000 MG/1
1000 TABLET, EXTENDED RELEASE ORAL 2 TIMES DAILY
Qty: 60 TABLET | Refills: 6 | Status: SHIPPED | OUTPATIENT
Start: 2017-08-15 | End: 2019-01-09 | Stop reason: SDUPTHER

## 2017-08-15 RX ORDER — SPIRONOLACTONE 25 MG/1
25 TABLET ORAL DAILY
COMMUNITY
End: 2017-08-15 | Stop reason: SDUPTHER

## 2017-08-15 RX ORDER — ATORVASTATIN CALCIUM 20 MG/1
20 TABLET, FILM COATED ORAL DAILY
Qty: 30 TABLET | Refills: 6 | Status: SHIPPED | OUTPATIENT
Start: 2017-08-15 | End: 2019-05-02 | Stop reason: ALTCHOICE

## 2017-08-15 RX ORDER — NITROGLYCERIN 0.4 MG/1
0.4 TABLET SUBLINGUAL
Qty: 25 TABLET | Refills: 2 | Status: SHIPPED | OUTPATIENT
Start: 2017-08-15 | End: 2018-03-13 | Stop reason: SDUPTHER

## 2017-08-15 RX ORDER — RANOLAZINE 1000 MG/1
1000 TABLET, EXTENDED RELEASE ORAL 2 TIMES DAILY
COMMUNITY
End: 2017-08-15 | Stop reason: SDUPTHER

## 2017-08-15 RX ORDER — PRASUGREL 10 MG/1
10 TABLET, FILM COATED ORAL DAILY
Qty: 30 TABLET | Refills: 6 | Status: SHIPPED | OUTPATIENT
Start: 2017-08-15 | End: 2019-11-12 | Stop reason: ALTCHOICE

## 2017-08-15 RX ORDER — SOTALOL HYDROCHLORIDE 80 MG/1
80 TABLET ORAL 2 TIMES DAILY
Qty: 60 TABLET | Refills: 6 | Status: SHIPPED | OUTPATIENT
Start: 2017-08-15 | End: 2019-05-15 | Stop reason: ALTCHOICE

## 2017-08-15 RX ORDER — TRANDOLAPRIL TABLETS 4 MG/1
4 TABLET ORAL DAILY
Qty: 30 TABLET | Refills: 0 | Status: SHIPPED | OUTPATIENT
Start: 2017-08-15 | End: 2017-09-26 | Stop reason: SDUPTHER

## 2017-09-18 RX ORDER — AMLODIPINE BESYLATE 10 MG/1
TABLET ORAL
Qty: 30 TABLET | Refills: 6 | Status: SHIPPED | OUTPATIENT
Start: 2017-09-18 | End: 2019-03-21 | Stop reason: DRUGHIGH

## 2017-09-26 RX ORDER — TRANDOLAPRIL TABLETS 4 MG/1
TABLET ORAL
Qty: 30 TABLET | Refills: 3 | Status: SHIPPED | OUTPATIENT
Start: 2017-09-26 | End: 2019-03-14

## 2017-09-27 RX ORDER — LEVOTHYROXINE SODIUM 0.07 MG/1
75 TABLET ORAL DAILY
Qty: 30 TABLET | Refills: 5 | Status: SHIPPED | OUTPATIENT
Start: 2017-09-27

## 2017-09-27 RX ORDER — EZETIMIBE 10 MG/1
10 TABLET ORAL DAILY
Qty: 30 TABLET | Refills: 5 | Status: SHIPPED | OUTPATIENT
Start: 2017-09-27

## 2017-10-10 ENCOUNTER — HOSPITAL ENCOUNTER (OUTPATIENT)
Dept: CARDIOLOGY | Facility: HOSPITAL | Age: 62
Discharge: HOME | End: 2017-10-10

## 2017-10-10 ENCOUNTER — OUTSIDE FACILITY SERVICE (OUTPATIENT)
Dept: CARDIOLOGY | Facility: CLINIC | Age: 62
End: 2017-10-10

## 2017-10-10 DIAGNOSIS — I25.118 CORONARY ARTERY DISEASE OF NATIVE HEART WITH STABLE ANGINA PECTORIS, UNSPECIFIED VESSEL OR LESION TYPE (HCC): ICD-10-CM

## 2017-10-10 DIAGNOSIS — I10 ESSENTIAL HYPERTENSION: ICD-10-CM

## 2017-10-10 DIAGNOSIS — I20.9 ISCHEMIC CHEST PAIN (HCC): ICD-10-CM

## 2017-10-10 DIAGNOSIS — I51.9 LV DYSFUNCTION: ICD-10-CM

## 2017-10-10 DIAGNOSIS — E78.00 PURE HYPERCHOLESTEROLEMIA: ICD-10-CM

## 2017-10-10 LAB
MAXIMAL PREDICTED HEART RATE: 160 BPM
STRESS TARGET HR: 136 BPM

## 2017-10-10 PROCEDURE — 93017 CV STRESS TEST TRACING ONLY: CPT

## 2017-10-10 PROCEDURE — 93306 TTE W/DOPPLER COMPLETE: CPT | Performed by: INTERNAL MEDICINE

## 2017-10-10 PROCEDURE — 25010000002 REGADENOSON 0.4 MG/5ML SOLUTION: Performed by: INTERNAL MEDICINE

## 2017-10-10 PROCEDURE — A9500 TC99M SESTAMIBI: HCPCS | Performed by: INTERNAL MEDICINE

## 2017-10-10 PROCEDURE — 93306 TTE W/DOPPLER COMPLETE: CPT

## 2017-10-10 PROCEDURE — 78452 HT MUSCLE IMAGE SPECT MULT: CPT

## 2017-10-10 PROCEDURE — 0 TECHNETIUM SESTAMIBI: Performed by: INTERNAL MEDICINE

## 2017-10-10 PROCEDURE — 93018 CV STRESS TEST I&R ONLY: CPT | Performed by: INTERNAL MEDICINE

## 2017-10-10 PROCEDURE — 78452 HT MUSCLE IMAGE SPECT MULT: CPT | Performed by: INTERNAL MEDICINE

## 2017-10-10 RX ADMIN — TECHNETIUM TC-99M SESTAMIBI 1 DOSE: 1 INJECTION INTRAVENOUS at 10:30

## 2017-10-10 RX ADMIN — REGADENOSON 0.4 MG: 0.08 INJECTION, SOLUTION INTRAVENOUS at 10:30

## 2017-10-17 ENCOUNTER — TELEPHONE (OUTPATIENT)
Dept: CARDIOLOGY | Facility: CLINIC | Age: 62
End: 2017-10-17

## 2017-10-18 ENCOUNTER — TELEPHONE (OUTPATIENT)
Dept: CARDIOLOGY | Facility: CLINIC | Age: 62
End: 2017-10-18

## 2017-10-18 NOTE — TELEPHONE ENCOUNTER
Patient called, he is having problems with getting refills on Effient and Bumex.    I called [facility].  These were both e-prescribed on 8/15/17 office visit along with other scripts.    I spoke with Karime, she has now found the e-prescriptions and will get ready for patient.  I also called in 30 days only of patient's Protonix.  He is having trouble getting Dr. Lobo to return his phone calls for refill.

## 2017-10-27 ENCOUNTER — OUTSIDE FACILITY SERVICE (OUTPATIENT)
Dept: CARDIOLOGY | Facility: CLINIC | Age: 62
End: 2017-10-27

## 2017-10-27 PROCEDURE — 93459 L HRT ART/GRFT ANGIO: CPT | Performed by: INTERNAL MEDICINE

## 2017-11-02 ENCOUNTER — TELEPHONE (OUTPATIENT)
Dept: CARDIOLOGY | Facility: CLINIC | Age: 62
End: 2017-11-02

## 2017-11-02 NOTE — TELEPHONE ENCOUNTER
"Patient called with concerns about with no refills on Sotalol and Effient from his pharmacy. Called pharmacy spoke with Karime concerning patients refills on Sotalol and Effient, Karime states \"he has refills, will get medication ready for \". Phoned patient back explained to patient refills are at pharmacy, patient verbalized understanding.  "

## 2018-03-13 ENCOUNTER — OFFICE VISIT (OUTPATIENT)
Dept: CARDIOLOGY | Facility: CLINIC | Age: 63
End: 2018-03-13

## 2018-03-13 VITALS
SYSTOLIC BLOOD PRESSURE: 120 MMHG | DIASTOLIC BLOOD PRESSURE: 86 MMHG | BODY MASS INDEX: 35.21 KG/M2 | WEIGHT: 260 LBS | HEIGHT: 72 IN | HEART RATE: 74 BPM

## 2018-03-13 DIAGNOSIS — R06.02 SHORTNESS OF BREATH: ICD-10-CM

## 2018-03-13 DIAGNOSIS — E03.9 ACQUIRED HYPOTHYROIDISM: ICD-10-CM

## 2018-03-13 DIAGNOSIS — I25.118 CORONARY ARTERY DISEASE OF NATIVE ARTERY OF NATIVE HEART WITH STABLE ANGINA PECTORIS (HCC): Primary | ICD-10-CM

## 2018-03-13 DIAGNOSIS — E78.00 HYPERCHOLESTEREMIA: ICD-10-CM

## 2018-03-13 DIAGNOSIS — I10 ESSENTIAL HYPERTENSION: ICD-10-CM

## 2018-03-13 DIAGNOSIS — I49.3 PVC (PREMATURE VENTRICULAR CONTRACTION): ICD-10-CM

## 2018-03-13 DIAGNOSIS — I51.9 LV DYSFUNCTION: ICD-10-CM

## 2018-03-13 DIAGNOSIS — IMO0001 CLASS 2 OBESITY DUE TO EXCESS CALORIES WITH SERIOUS COMORBIDITY AND BODY MASS INDEX (BMI) OF 35.0 TO 35.9 IN ADULT: ICD-10-CM

## 2018-03-13 DIAGNOSIS — E11.9 TYPE 2 DIABETES MELLITUS WITHOUT COMPLICATION, WITHOUT LONG-TERM CURRENT USE OF INSULIN (HCC): ICD-10-CM

## 2018-03-13 DIAGNOSIS — Z72.0 TOBACCO ABUSE: ICD-10-CM

## 2018-03-13 PROCEDURE — 93000 ELECTROCARDIOGRAM COMPLETE: CPT | Performed by: NURSE PRACTITIONER

## 2018-03-13 PROCEDURE — 99214 OFFICE O/P EST MOD 30 MIN: CPT | Performed by: NURSE PRACTITIONER

## 2018-03-13 PROCEDURE — 99406 BEHAV CHNG SMOKING 3-10 MIN: CPT | Performed by: NURSE PRACTITIONER

## 2018-03-13 RX ORDER — NITROGLYCERIN 0.4 MG/1
0.4 TABLET SUBLINGUAL
Qty: 25 TABLET | Refills: 2 | Status: SHIPPED | OUTPATIENT
Start: 2018-03-13 | End: 2019-01-22 | Stop reason: SDUPTHER

## 2018-03-13 RX ORDER — ISOSORBIDE MONONITRATE 30 MG/1
30 TABLET, EXTENDED RELEASE ORAL DAILY
Qty: 30 TABLET | Refills: 8 | Status: SHIPPED | OUTPATIENT
Start: 2018-03-13 | End: 2019-03-14

## 2018-03-13 NOTE — PROGRESS NOTES
Chief Complaint   Patient presents with   • Follow-up     Hospital follow-up, had heart cath on 10/27/17. Reports that he is actively having chest pains today, took a nitro as we got into the room, reports that they are on the left side of his chest. Reports he has been getting chest pains for a while. Reports that he does occasionally have shortness of breath. Reports he does have palpitations occasionally.    • Med Refill     Did not bring medication list.        Cardiac Complaints  chest pressure/discomfort and dyspnea, palpitations      Subjective   Roberth Kearns is a 62 y.o. male with HTN, hyperlipidemia, and  ischemic heart disease diagnosed in 2001 when he underwent bypass surgery followed by redo bypass surgery and multiple stentings.  Last follow up visit in January 2017, he continued to have chest discomfort. Ranexa was increased to 1000 mg BID and he was advised to undergo stress testing.  He had some problems with insurance and was unable to complete testing. At last visit, he continued to report chest pain relieved with NTG.  Cardiac workup with stress and echo advised.  Stress showed anterior infarct with ischemia and cath was advised. Diffuse distal disease was found with medical management advised as he had no target vessels to put the stents.  He returns today for follow up and states he continues to have chest pain.  He continues to have to take NTG SL for pain and takes it most days.  He does have shortness of breath with exertion but states no worse than prior, attributes to his smoking.  He does continue to have palpitations but no worse than prior.  He did not bring a current medication list to today's appointment.  Labs are done with PCP.          Cardiac History  Past Surgical History:   Procedure Laterality Date   • CARDIAC CATHETERIZATION  06/18/2007    Cath-Same Findings. PTCA of .   • CARDIAC CATHETERIZATION  08/26/2004    Cath-Patent Grafts-85%LCX at [facility].   •  CARDIAC CATHETERIZATION  01/2002    Cath-Brachy Therapy of OM, Stent in RCA.   • CARDIAC CATHETERIZATION  06/2001    Cath-Oconnor in OM   • CARDIAC CATHETERIZATION  03/25/2016    90% SVG to AM- 2.5x9 & 2.25x12 ALEX. 80% SVG to OM- 3.5x8 ALEX. 100% SVG to PDA   • CARDIAC CATHETERIZATION  10/27/2017    Patent Grafts and stents. Diffuse distal Disease   • CARDIOVASCULAR STRESS TEST  07/22/2008    Stress- 6Min, 72%THR. Anterol Lateral Infarct with Yuni Infarct Ischemia.   • CARDIOVASCULAR STRESS TEST  01/24/2013    L. Myoview-EF 40%. AnteroLateral Infarct with PerInfarct Ischemia.   • CARDIOVASCULAR STRESS TEST  09/22/2015    Lexiscan stress-mod fixed defect   • CARDIOVASCULAR STRESS TEST  10/10/2017    L.Myoview- EF 55%. Anterior Infarct with Ischemia. Small Inferior Ischemia.   • CATH LAB PROCEDURE  05/10/2013    Cath-90% SVG to OM-5.0 x 8 mm BMS.   • CONVERTED (HISTORICAL) HOLTER  06/28/2007    Holter-AVG HR 63 bpm, tow (3beats) runs of V-tach.   • CONVERTED (HISTORICAL) HOLTER  07/15/2013    Holter-AVGHR 56 BPM. 2 runs of V-tach.   • CORONARY ARTERY BYPASS GRAFT  04/2001    CABG-LIMA to LAD, SVG to RCA.   • CORONARY ARTERY BYPASS GRAFT  09/18/2002    Redo CABG-SVG to distal OM, SVG to PDA.   • ECHO - CONVERTED  06/18/2007    Echo-EF 45, Lateral WMA.   • ECHO - CONVERTED  11/03/2008    Echo-EF 45-49% Trace MR and MVP.   • ECHO - CONVERTED  01/24/2013    Echo-EF 45%   • ECHO - CONVERTED  09/23/2014    Echo-([facility]) EF 50%. Septal WMA.   • ECHO - CONVERTED  09/22/2015    Echo-EF 45-50%, RVSP 15 mmHg, mod MR.   • ECHO - CONVERTED  03/25/2016    EF 40%   • ECHO - CONVERTED  10/10/2017    EF 45-50%.Anterior WMA       Current Outpatient Prescriptions   Medication Sig Dispense Refill   • nitroglycerin (NITROSTAT) 0.4 MG SL tablet Place 1 tablet under the tongue Every 5 (Five) Minutes As Needed for Chest Pain. Take no more than 3 doses in 15 minutes. 25 tablet 2   • prasugrel (EFFIENT) 10 MG tablet Take 1 tablet by mouth Daily. 30  tablet 6   • ranolazine (RANEXA) 1000 MG 12 hr tablet Take 1 tablet by mouth 2 (Two) Times a Day. 60 tablet 6   • sotalol (BETAPACE) 80 MG tablet Take 1 tablet by mouth 2 (Two) Times a Day. 60 tablet 6   • amLODIPine (NORVASC) 10 MG tablet TAKE ONE TABLET ONCE DAILY 30 tablet 6   • aspirin 81 MG EC tablet Take 81 mg by mouth daily. Decrease dose to once a day with Effient     • atorvastatin (LIPITOR) 20 MG tablet Take 1 tablet by mouth Daily. 30 tablet 6   • bumetanide (BUMEX) 1 MG tablet Take 1 tablet by mouth Daily. 30 tablet 6   • ezetimibe (ZETIA) 10 MG tablet Take 1 tablet by mouth Daily. 30 tablet 5   • HYDROcodone-acetaminophen (NORCO)  MG per tablet Take 1 tablet by mouth 4 (Four) Times a Day.     • isosorbide mononitrate (IMDUR) 30 MG 24 hr tablet Take 1 tablet by mouth Daily. 30 tablet 8   • levothyroxine (SYNTHROID, LEVOTHROID) 75 MCG tablet Take 1 tablet by mouth Daily. 30 tablet 5   • metFORMIN (GLUCOPHAGE) 500 MG tablet Take 500 mg by mouth 2 (Two) Times a Day With Meals.     • Omega-3 Fatty Acids (FISH OIL) 1000 MG capsule capsule Take  by mouth 2 (two) times a day.     • pantoprazole (PROTONIX) 40 MG EC tablet Take 40 mg by mouth Daily.     • potassium chloride (K-DUR,KLOR-CON) 20 MEQ CR tablet Take 1 tablet by mouth Daily. 30 tablet 6   • spironolactone (ALDACTONE) 25 MG tablet Take 1 tablet by mouth Daily. 30 tablet 6   • trandolapril (MAVIK) 4 MG tablet TAKE ONE TABLET ONCE DAILY 30 tablet 3     No current facility-administered medications for this visit.        Review of patient's allergies indicates no known allergies.    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)     mild   • Shortness of breath    • V tach   "   PVC, on Sotalol       Social History     Social History   • Marital status:      Spouse name: N/A   • Number of children: N/A   • Years of education: N/A     Occupational History   • Not on file.     Social History Main Topics   • Smoking status: Current Every Day Smoker     Packs/day: 1.00     Years: 40.00   • Smokeless tobacco: Never Used      Comment: counseled patient on effect's of tobacco use on health.   • Alcohol use No   • Drug use: No   • Sexual activity: Not on file     Other Topics Concern   • Not on file     Social History Narrative   • No narrative on file       Family History   Problem Relation Age of Onset   • Hypertension Mother    • Cancer Father    • Heart disease Other    • Hyperlipidemia Other    • Hypertension Other    • Hypertension Other        Review of Systems   Constitution: Negative for weakness and malaise/fatigue.   Cardiovascular: Positive for chest pain, dyspnea on exertion and palpitations. Negative for claudication, irregular heartbeat, near-syncope, orthopnea, paroxysmal nocturnal dyspnea and syncope.   Respiratory: Positive for shortness of breath. Negative for wheezing.    Hematologic/Lymphatic: Negative for bleeding problem. Does not bruise/bleed easily.   Skin: Negative for poor wound healing.   Musculoskeletal: Negative for back pain, joint pain and joint swelling.   Gastrointestinal: Negative for anorexia, heartburn and nausea.   Genitourinary: Negative for dysuria, hesitancy and nocturia.   Neurological: Negative for dizziness, light-headedness and loss of balance.   Psychiatric/Behavioral: Negative for depression and memory loss.       DiabetesYes  Thyroid abnormal    Objective     /86 (BP Location: Left arm)   Pulse 74   Ht 182.9 cm (72.01\")   Wt 118 kg (260 lb)   BMI 35.25 kg/m²     Physical Exam   Constitutional: He is oriented to person, place, and time. He appears well-developed and well-nourished.   HENT:   Head: Normocephalic and atraumatic. "   Eyes: EOM are normal. Pupils are equal, round, and reactive to light.   Neck: Normal range of motion. Neck supple.   Cardiovascular: Normal rate and regular rhythm.    Murmur heard.  Pulmonary/Chest: Effort normal and breath sounds normal.   Coarse breath sounds   Abdominal: Soft.   Musculoskeletal: Normal range of motion.   Neurological: He is alert and oriented to person, place, and time.   Skin: Skin is warm and dry.   Psychiatric: He has a normal mood and affect. His behavior is normal.         ECG 12 Lead  Date/Time: 3/13/2018 2:27 PM  Performed by: SUNG MARQUIS  Authorized by: SUNG MARQUIS   Rhythm: sinus rhythm  BPM: 74  Conduction: 1st degree  Clinical impression: abnormal ECG            Assessment/Plan     HR and BP are stable today.  HTN well managed on current.  No adjustments to current therapy advised.  He does continue to have chest discomfort on a daily basis.  We will advise to add a low dose imdur to current regimen.  Recent cath report discussed with patient that showed severe, diffuse distal disease, no target vessels to place stents.  DAPT therapy continued.  We also talked about increasing his ranexa to 1000mg BID, but he states he had too many side effects before.  EKG done today for PVCs managed with sotalol therapy shows a sinus efrain with first degree block and normal QT, current continued.  Labs he states are done with your office, could we get next for our review?  He states you are managing both his AIC and FLP for diabetes and hypercholesteremia management.   Unfortunately, he has not yet been able to quit smoking and is still currently smoking about 1 ppd.  We discussed several methods/modalities for quitting, but he is not ready.  We discussed for greater than 3 minutes. BMI elevated at 35.  Discussed with him limiting his carbs and sugars for better weight management, as well as helping him to control his DM.  Patient also encouraged to be more active with walking.  6 month  follow up advised or sooner if needed.         Problems Addressed this Visit        Cardiovascular and Mediastinum    LV dysfunction    Relevant Medications    isosorbide mononitrate (IMDUR) 30 MG 24 hr tablet    nitroglycerin (NITROSTAT) 0.4 MG SL tablet    CAD (coronary artery disease) - Primary    Relevant Medications    isosorbide mononitrate (IMDUR) 30 MG 24 hr tablet    nitroglycerin (NITROSTAT) 0.4 MG SL tablet    HTN (hypertension)       Endocrine    Hypothyroid      Other Visit Diagnoses     Hypercholesteremia        Type 2 diabetes mellitus without complication, without long-term current use of insulin        Shortness of breath        Tobacco abuse        Class 2 obesity due to excess calories with serious comorbidity and body mass index (BMI) of 35.0 to 35.9 in adult        PVC (premature ventricular contraction)        Relevant Medications    isosorbide mononitrate (IMDUR) 30 MG 24 hr tablet    nitroglycerin (NITROSTAT) 0.4 MG SL tablet    Other Relevant Orders    ECG 12 Lead        I advised the patient of the risks in continuing to use tobacco, and I provided this patient with smoking cessation educational materials.    During this visit, I spent > 3-10 minutes counseling the patient regarding smoking cessation.    Discussed the patient's BMI with him. BMI is above normal parameters. Follow-up plan includes:  nutrition counseling.        Electronically signed by MIGUELITO Herrmann March 13, 2018 4:53 PM

## 2018-11-01 ENCOUNTER — OFFICE VISIT (OUTPATIENT)
Dept: CARDIOLOGY | Facility: CLINIC | Age: 63
End: 2018-11-01

## 2018-11-01 VITALS
BODY MASS INDEX: 34.81 KG/M2 | HEART RATE: 64 BPM | SYSTOLIC BLOOD PRESSURE: 120 MMHG | HEIGHT: 72 IN | DIASTOLIC BLOOD PRESSURE: 80 MMHG | WEIGHT: 257 LBS

## 2018-11-01 DIAGNOSIS — R06.02 SHORTNESS OF BREATH: ICD-10-CM

## 2018-11-01 DIAGNOSIS — I49.3 PVC (PREMATURE VENTRICULAR CONTRACTION): Primary | ICD-10-CM

## 2018-11-01 DIAGNOSIS — I25.118 CORONARY ARTERY DISEASE OF NATIVE ARTERY OF NATIVE HEART WITH STABLE ANGINA PECTORIS (HCC): ICD-10-CM

## 2018-11-01 DIAGNOSIS — Z72.0 TOBACCO ABUSE: ICD-10-CM

## 2018-11-01 DIAGNOSIS — I51.9 LV DYSFUNCTION: ICD-10-CM

## 2018-11-01 DIAGNOSIS — I10 ESSENTIAL HYPERTENSION: ICD-10-CM

## 2018-11-01 DIAGNOSIS — E66.9 OBESITY (BMI 30-39.9): ICD-10-CM

## 2018-11-01 DIAGNOSIS — Z79.899 LONG TERM CURRENT USE OF ANTIARRHYTHMIC DRUG: ICD-10-CM

## 2018-11-01 DIAGNOSIS — K21.9 GASTROESOPHAGEAL REFLUX DISEASE, ESOPHAGITIS PRESENCE NOT SPECIFIED: ICD-10-CM

## 2018-11-01 DIAGNOSIS — E03.9 ACQUIRED HYPOTHYROIDISM: ICD-10-CM

## 2018-11-01 PROCEDURE — 93000 ELECTROCARDIOGRAM COMPLETE: CPT | Performed by: NURSE PRACTITIONER

## 2018-11-01 PROCEDURE — 99406 BEHAV CHNG SMOKING 3-10 MIN: CPT | Performed by: NURSE PRACTITIONER

## 2018-11-01 PROCEDURE — 99214 OFFICE O/P EST MOD 30 MIN: CPT | Performed by: NURSE PRACTITIONER

## 2018-11-01 NOTE — PROGRESS NOTES
Chief Complaint   Patient presents with   • Follow-up     For cardiac management. Patient is on aspirin. Reports that he has had some chest pains off and on. Reports he occasionally has shortness of breath. Last lab work was done two months per PCP, not in chart.    • Med Refill     Did not bring medication list.        Cardiac Complaints  chest pressure/discomfort and dyspnea      Subjective   Roberth Kearns is a 63 y.o. male with HTN, hyperlipidemia, paroxysmal VT/PVCs, and  ischemic heart disease diagnosed in 2001 when he underwent bypass surgery followed by redo bypass surgery and multiple stentings.  Last follow up visit in January 2017, he continued to have chest discomfort. Ranexa was increased to 1000 mg BID and he was advised to undergo stress testing.  He had some problems with insurance and was unable to complete testing. At last visit, he continued to report chest pain relieved with NTG.  Cardiac workup with stress and echo advised.  Stress showed anterior infarct with ischemia and cath was advised. Diffuse distal disease was found with medical management advised as he had no target vessels to put the stents. At last visit, he did report continued chest pain and imdur was added.  He returns today for follow up and denies any new concerns.  He does report continued chest pain but states it is no worse than before and just comes and goes and is not related to anything in particular.  He does report occasional shortness of breath with exertion but admits it is no worse than prior.  Labs are done with PCP and most recent done about two months ago, but he admits everything was okay according to PCP. He also had a abdominal xray for nausea and everything was okay.  No refills needed.    Cardiac History  Past Surgical History:   Procedure Laterality Date   • CARDIAC CATHETERIZATION  06/18/2007    Cath-Same Findings. PTCA of .   • CARDIAC CATHETERIZATION  08/26/2004    Cath-Patent Grafts-85%LCX at [facility].   • CARDIAC CATHETERIZATION  01/2002    Cath-Brachy Therapy of OM, Stent in RCA.   • CARDIAC CATHETERIZATION  06/2001    Cath-[place] in OM   • CARDIAC CATHETERIZATION  03/25/2016    90% SVG to AM- 2.5x9 & 2.25x12 ALEX. 80% SVG to OM- 3.5x8 ALEX. 100% SVG to PDA   • CARDIAC CATHETERIZATION  10/27/2017    Patent Grafts and stents. Diffuse distal Disease   • CARDIOVASCULAR STRESS TEST  07/22/2008    Stress- 6Min, 72%THR. Anterol Lateral Infarct with [place] Infarct Ischemia.   • CARDIOVASCULAR STRESS TEST  01/24/2013    L. Myoview-EF 40%. AnteroLateral Infarct with PerInfarct Ischemia.   • CARDIOVASCULAR STRESS TEST  09/22/2015    Lexiscan stress-mod fixed defect   • CARDIOVASCULAR STRESS TEST  10/10/2017    L.Myoview- EF 55%. Anterior Infarct with Ischemia. Small Inferior Ischemia.   • CATH LAB PROCEDURE  05/10/2013    Cath-90% SVG to OM-5.0 x 8 mm BMS.   • CONVERTED (HISTORICAL) HOLTER  06/28/2007    Holter-AVG HR 63 bpm, tow (3beats) runs of V-tach.   • CONVERTED (HISTORICAL) HOLTER  07/15/2013    Holter-AVGHR 56 BPM. 2 runs of V-tach.   • CORONARY ARTERY BYPASS GRAFT  04/2001    CABG-LIMA to LAD, SVG to RCA.   • CORONARY ARTERY BYPASS GRAFT  09/18/2002    Redo CABG-SVG to distal OM, SVG to PDA.   • ECHO - CONVERTED  06/18/2007    Echo-EF 45, Lateral WMA.   • ECHO - CONVERTED  11/03/2008    Echo-EF 45-49% Trace MR and MVP.   • ECHO - CONVERTED  01/24/2013    Echo-EF 45%   • ECHO - CONVERTED  09/23/2014    Echo-([facility]) EF 50%. Septal WMA.   • ECHO - CONVERTED  09/22/2015    Echo-EF 45-50%, RVSP 15 mmHg, mod MR.   • ECHO - CONVERTED  03/25/2016    EF 40%   • ECHO - CONVERTED  10/10/2017    EF 45-50%.Anterior WMA       Current Outpatient Prescriptions   Medication Sig Dispense Refill   • aspirin 81 MG EC tablet Take 81 mg by mouth daily. Decrease dose to once a day with Effient     • amLODIPine (NORVASC) 10 MG tablet TAKE ONE TABLET ONCE DAILY 30 tablet 6   • atorvastatin (LIPITOR) 20 MG tablet Take 1 tablet by  mouth Daily. 30 tablet 6   • bumetanide (BUMEX) 1 MG tablet Take 1 tablet by mouth Daily. 30 tablet 6   • ezetimibe (ZETIA) 10 MG tablet Take 1 tablet by mouth Daily. 30 tablet 5   • HYDROcodone-acetaminophen (NORCO)  MG per tablet Take 1 tablet by mouth 4 (Four) Times a Day.     • isosorbide mononitrate (IMDUR) 30 MG 24 hr tablet Take 1 tablet by mouth Daily. 30 tablet 8   • levothyroxine (SYNTHROID, LEVOTHROID) 75 MCG tablet Take 1 tablet by mouth Daily. 30 tablet 5   • metFORMIN (GLUCOPHAGE) 500 MG tablet Take 500 mg by mouth 2 (Two) Times a Day With Meals.     • nitroglycerin (NITROSTAT) 0.4 MG SL tablet Place 1 tablet under the tongue Every 5 (Five) Minutes As Needed for Chest Pain. Take no more than 3 doses in 15 minutes. 25 tablet 2   • Omega-3 Fatty Acids (FISH OIL) 1000 MG capsule capsule Take  by mouth 2 (two) times a day.     • pantoprazole (PROTONIX) 40 MG EC tablet Take 40 mg by mouth Daily.     • potassium chloride (K-DUR,KLOR-CON) 20 MEQ CR tablet Take 1 tablet by mouth Daily. 30 tablet 6   • prasugrel (EFFIENT) 10 MG tablet Take 1 tablet by mouth Daily. 30 tablet 6   • ranolazine (RANEXA) 1000 MG 12 hr tablet Take 1 tablet by mouth 2 (Two) Times a Day. 60 tablet 6   • sotalol (BETAPACE) 80 MG tablet Take 1 tablet by mouth 2 (Two) Times a Day. 60 tablet 6   • spironolactone (ALDACTONE) 25 MG tablet Take 1 tablet by mouth Daily. 30 tablet 6   • trandolapril (MAVIK) 4 MG tablet TAKE ONE TABLET ONCE DAILY 30 tablet 3     No current facility-administered medications for this visit.        Patient has no known allergies.    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)     mild   • Shortness of breath    • V tach (CMS/HCC)   "   PVC, on Sotalol       Social History     Social History   • Marital status:      Spouse name: N/A   • Number of children: N/A   • Years of education: N/A     Occupational History   • Not on file.     Social History Main Topics   • Smoking status: Current Every Day Smoker     Packs/day: 1.00     Years: 40.00   • Smokeless tobacco: Never Used      Comment: counseled patient on effect's of tobacco use on health.   • Alcohol use No   • Drug use: No   • Sexual activity: Not on file     Other Topics Concern   • Not on file     Social History Narrative   • No narrative on file       Family History   Problem Relation Age of Onset   • Hypertension Mother    • Cancer Father    • Heart disease Other    • Hyperlipidemia Other    • Hypertension Other    • Hypertension Other        Review of Systems   Constitution: Negative for weakness and malaise/fatigue.   Cardiovascular: Positive for chest pain and dyspnea on exertion. Negative for claudication, cyanosis, near-syncope, orthopnea, palpitations and syncope.   Respiratory: Positive for cough and shortness of breath. Negative for wheezing.    Musculoskeletal: Negative for back pain, joint pain and joint swelling.   Gastrointestinal: Negative for anorexia, heartburn, nausea and vomiting.   Genitourinary: Negative for dysuria, hematuria, hesitancy and nocturia.   Neurological: Negative for dizziness, light-headedness and loss of balance.   Psychiatric/Behavioral: Negative for depression and memory loss. The patient is not nervous/anxious.            Objective     /80 (BP Location: Left arm)   Pulse 64   Ht 182.9 cm (72.01\")   Wt 117 kg (257 lb)   BMI 34.85 kg/m²     Physical Exam   Constitutional: He is oriented to person, place, and time. He appears well-developed and well-nourished.   HENT:   Head: Normocephalic and atraumatic.   Eyes: Pupils are equal, round, and reactive to light. EOM are normal.   Neck: Normal range of motion. Neck supple.   Cardiovascular: " Normal rate and regular rhythm.    Murmur heard.  Pulmonary/Chest: Effort normal.   Coarse breath sounds noted   Abdominal: Soft.   Musculoskeletal: Normal range of motion.   Neurological: He is alert and oriented to person, place, and time.   Skin: Skin is warm and dry.   Psychiatric: He has a normal mood and affect. His behavior is normal.         ECG 12 Lead  Date/Time: 11/1/2018 10:33 AM  Performed by: SUNG MARQUIS  Authorized by: SUNG MARQUIS   Rhythm: sinus rhythm  BPM: 64  Conduction: 1st degree  Clinical impression: abnormal ECG  Comments: Normal QT            Assessment/Plan     HR and BP are stable today.  HTN is currently well managed on current, no adjustment to current regimen will be recommended.  With CAD and stable angina, he remains on imdur therapy and does report continued chest pain although no worse than prior.  We discussed increasing his long acting NTG to aid in symptoms but he does not feel it is bad enough to warrant any changes at present. He will continue on current ranexa dosing as increased dose made the patient feel sick. He continues on DAPT therapy for history of IHD and tolerates well without complaints of bruising/bleeding. EKG done today for history of PVCs and paroxysmal VT managed with sotalol therapy shows a NSR with first degree block and normal QT.  Current will be continued as rhythm is regular, no PVCs noted, and palpitations are denied.  He does continue to follow with your office for hyperlipidemia management and states you are following FLP in regards, he thinks most recent looked okay.  He thinks his DM has been well controlled although he could not voice most recent AIC.  Could we have a recent copy of labs for our records?  No refills are needed as he reports with your office.  He has not been successful in his smoking cessation attempts and we discussed cessation for greater than 3 minutes and the benefits for his health.  At present, he just does not feel he  is mentally ready to quit smoking and thinks medication would be of no use until he is ready.  BMI does remain elevated at 34.85 and we discussed low calorie, low carb, and low saturated fat diet.  6 month follow up visit will be scheduled or sooner if needed.      Problems Addressed this Visit        Cardiovascular and Mediastinum    LV dysfunction    CAD (coronary artery disease)    HTN (hypertension)       Digestive    GERD (gastroesophageal reflux disease)       Endocrine    Hypothyroid      Other Visit Diagnoses     PVC (premature ventricular contraction)    -  Primary    Relevant Orders    ECG 12 Lead    Shortness of breath        Long term current use of antiarrhythmic drug        Obesity (BMI 30-39.9)        Tobacco abuse              Patient's Body mass index is 34.85 kg/m². BMI is above normal parameters. Recommendations include: nutrition counseling.      I advised Roberth of the risks of continuing to use tobacco, and I provided him with tobacco cessation educational materials in the After Visit Summary.     During this visit, I spent over 3 minutes counseling the patient regarding tobacco cessation.            Electronically signed by MIGUELITO Herrmann November 1, 2018 3:27 PM

## 2019-01-09 ENCOUNTER — TELEPHONE (OUTPATIENT)
Dept: CARDIOLOGY | Facility: CLINIC | Age: 64
End: 2019-01-09

## 2019-01-09 RX ORDER — RANOLAZINE 1000 MG/1
1000 TABLET, EXTENDED RELEASE ORAL EVERY 12 HOURS SCHEDULED
Qty: 60 TABLET | Refills: 6 | Status: SHIPPED | OUTPATIENT
Start: 2019-01-09 | End: 2019-05-02 | Stop reason: ALTCHOICE

## 2019-01-09 NOTE — TELEPHONE ENCOUNTER
Pt aware to increase Ranexa to 1000 mg BID, script sent.  Advised to call back to schedule cath if continues to have problems.

## 2019-01-09 NOTE — TELEPHONE ENCOUNTER
Pt called, has been having more CP, relieved with 1-2 nitro's.  States he has only been taking 500mg of Ranexa BID due to neck and jaw pain when he tried to increased to 1000 mg BID a few years ago.  States he hasn't taken Imdur since 2002 due to severe headache. Advised him that that is not accurate at all according to our records, for him  to MAKE SURE and bring an updated med list to next appointment. He is asking if it would be ok to try to increase the Ranexa again to 1000 mg BID to see if that helps.

## 2019-01-22 ENCOUNTER — TELEPHONE (OUTPATIENT)
Dept: CARDIOLOGY | Facility: CLINIC | Age: 64
End: 2019-01-22

## 2019-01-22 DIAGNOSIS — I25.118 CORONARY ARTERY DISEASE OF NATIVE ARTERY OF NATIVE HEART WITH STABLE ANGINA PECTORIS (HCC): Primary | ICD-10-CM

## 2019-01-22 RX ORDER — NITROGLYCERIN 0.4 MG/1
0.4 TABLET SUBLINGUAL
Qty: 25 TABLET | Refills: 1 | Status: SHIPPED | OUTPATIENT
Start: 2019-01-22 | End: 2019-04-23 | Stop reason: SDUPTHER

## 2019-01-22 NOTE — TELEPHONE ENCOUNTER
Patient called requesting refill on NTG SL.    (see 1/9/19 telephone encounter-Ranexa increased to 1000 mg BID)    He said the Ranexa has helped some.  He reports when he uses NTG SL his chest pain is relieved with one tab.  He said since the Ranexa increase he has had about 5 chest pain episodes.

## 2019-01-23 NOTE — TELEPHONE ENCOUNTER
Patient is willing to proceed with cardiac catheterization.  Scheduled 1/29/19.    See referral for further communications.

## 2019-01-25 ENCOUNTER — OUTSIDE FACILITY SERVICE (OUTPATIENT)
Dept: CARDIOLOGY | Facility: CLINIC | Age: 64
End: 2019-01-25

## 2019-01-25 PROCEDURE — 93306 TTE W/DOPPLER COMPLETE: CPT | Performed by: INTERNAL MEDICINE

## 2019-01-25 PROCEDURE — 99223 1ST HOSP IP/OBS HIGH 75: CPT | Performed by: INTERNAL MEDICINE

## 2019-01-25 PROCEDURE — 93459 L HRT ART/GRFT ANGIO: CPT | Performed by: INTERNAL MEDICINE

## 2019-01-26 ENCOUNTER — OUTSIDE FACILITY SERVICE (OUTPATIENT)
Dept: CARDIOLOGY | Facility: CLINIC | Age: 64
End: 2019-01-26

## 2019-01-26 PROCEDURE — 99232 SBSQ HOSP IP/OBS MODERATE 35: CPT | Performed by: INTERNAL MEDICINE

## 2019-01-28 ENCOUNTER — OUTSIDE FACILITY SERVICE (OUTPATIENT)
Dept: CARDIOLOGY | Facility: CLINIC | Age: 64
End: 2019-01-28

## 2019-01-28 PROCEDURE — 99232 SBSQ HOSP IP/OBS MODERATE 35: CPT | Performed by: INTERNAL MEDICINE

## 2019-01-31 ENCOUNTER — TELEPHONE (OUTPATIENT)
Dept: CARDIOLOGY | Facility: CLINIC | Age: 64
End: 2019-01-31

## 2019-02-04 ENCOUNTER — TELEPHONE (OUTPATIENT)
Dept: CARDIOLOGY | Facility: CLINIC | Age: 64
End: 2019-02-04

## 2019-02-04 NOTE — TELEPHONE ENCOUNTER
Pt called, right ankle is swelling some, having occasional pain across his back. Denies any more SOB than normal. Asking if he should be concerned. Taking Bumex 1 mg daily. States BP was 117/68 at recent visit to PCP's office. Discharge summary is in chart.

## 2019-02-05 NOTE — TELEPHONE ENCOUNTER
Spoke at length with patient regarding low sodium diet and the importance of with a low EF. Confirmed he is wearing life vest as ordered.

## 2019-03-14 ENCOUNTER — OFFICE VISIT (OUTPATIENT)
Dept: CARDIOLOGY | Facility: CLINIC | Age: 64
End: 2019-03-14

## 2019-03-14 VITALS
HEIGHT: 72 IN | WEIGHT: 258 LBS | SYSTOLIC BLOOD PRESSURE: 132 MMHG | DIASTOLIC BLOOD PRESSURE: 76 MMHG | HEART RATE: 58 BPM | BODY MASS INDEX: 34.95 KG/M2

## 2019-03-14 DIAGNOSIS — I49.3 PVC (PREMATURE VENTRICULAR CONTRACTION): ICD-10-CM

## 2019-03-14 DIAGNOSIS — F17.200 SMOKING: ICD-10-CM

## 2019-03-14 DIAGNOSIS — R60.0 LOCALIZED EDEMA: ICD-10-CM

## 2019-03-14 DIAGNOSIS — I10 ESSENTIAL HYPERTENSION: ICD-10-CM

## 2019-03-14 DIAGNOSIS — I50.30 CONGESTIVE HEART FAILURE WITH LV DIASTOLIC DYSFUNCTION, NYHA CLASS 2 (HCC): Primary | ICD-10-CM

## 2019-03-14 DIAGNOSIS — E78.00 HYPERCHOLESTEREMIA: ICD-10-CM

## 2019-03-14 DIAGNOSIS — Z79.899 LONG TERM CURRENT USE OF ANTIARRHYTHMIC DRUG: ICD-10-CM

## 2019-03-14 DIAGNOSIS — I47.29 VENTRICULAR TACHYCARDIA, PAROXYSMAL (HCC): ICD-10-CM

## 2019-03-14 DIAGNOSIS — I44.0 FIRST DEGREE AV BLOCK: ICD-10-CM

## 2019-03-14 DIAGNOSIS — I25.9 IHD (ISCHEMIC HEART DISEASE): ICD-10-CM

## 2019-03-14 PROCEDURE — 99214 OFFICE O/P EST MOD 30 MIN: CPT | Performed by: NURSE PRACTITIONER

## 2019-03-14 PROCEDURE — 93000 ELECTROCARDIOGRAM COMPLETE: CPT | Performed by: NURSE PRACTITIONER

## 2019-03-14 RX ORDER — ALBUTEROL SULFATE 90 UG/1
2 AEROSOL, METERED RESPIRATORY (INHALATION) EVERY 4 HOURS PRN
COMMUNITY

## 2019-03-14 RX ORDER — GABAPENTIN 400 MG/1
400 CAPSULE ORAL 3 TIMES DAILY
COMMUNITY

## 2019-03-14 RX ORDER — FLUTICASONE PROPIONATE 44 UG/1
1 AEROSOL, METERED RESPIRATORY (INHALATION)
COMMUNITY

## 2019-03-14 RX ORDER — MELATONIN
1000 DAILY
COMMUNITY

## 2019-03-14 NOTE — PATIENT INSTRUCTIONS
"Heart Failure Eating Plan  Heart failure, also called congestive heart failure, occurs when your heart does not pump blood well enough to meet your body's needs for oxygen-rich blood. Heart failure is a long-term (chronic) condition. Living with heart failure can be challenging. However, following your health care provider's instructions about a healthy lifestyle and working with a diet and nutrition specialist (dietitian) to choose the right foods may help to improve your symptoms.  What are tips for following this plan?  General guidelines  · Do not eat more than 2,300 mg of salt (sodium) a day. The amount of sodium that is recommended for you may be lower, depending on your condition.  · Maintain a healthy body weight as directed. Ask your health care provider what a healthy weight is for you.  ? Check your weight every day.  ? Work with your health care provider and dietitian to make a plan that is right for you to lose weight or maintain your current weight.  · Limit how much fluid you drink. Ask your health care provider or dietitian how much fluid you can have each day.  · Limit or avoid alcohol as told by your health care provider or dietitian.  Reading food labels  · Check food labels for the amount of sodium per serving. Choose foods that have less than 140 mg (milligrams) of sodium in each serving.  · Check food labels for the number of calories per serving. This is important if you need to limit your daily calorie intake to lose weight.  · Check food labels for the serving size. If you eat more than one serving, you will be eating more sodium and calories than what is listed on the label.  · Look for foods that are labeled as \"sodium-free,\" \"very low sodium,\" or \"low sodium.\"  ? Foods labeled as \"reduced sodium\" or \"lightly salted\" may still have more sodium than what is recommended for you.  Cooking  · Avoid adding salt when cooking. Ask your health care provider or dietitian before using salt " substitutes.  · Season food with salt-free seasonings, spices, or herbs. Check the label of seasoning mixes to make sure they do not contain salt.  · Cook with heart-healthy oils, such as olive, canola, soybean, or sunflower oil.  · Do not rivero foods. Cook foods using low-fat methods, such as baking, boiling, grilling, and broiling.  · Limit unhealthy fats when cooking by:  ? Removing the skin from poultry, such as chicken.  ? Removing all visible fats from meats.  ? Skimming the fat off from stews, soups, and gravies before serving them.  Meal planning  · Limit your intake of:  ? Processed, canned, or pre-packaged foods.  ? Foods that are high in trans fat, such as fried foods.  ? Sweets, desserts, sugary drinks, and other foods with added sugar.  ? Full-fat dairy products, such as whole milk.  · Eat a balanced diet that includes:  ? 4-5 servings of fruit each day and 4-5 servings of vegetables each day. At each meal, try to fill half of your plate with fruits and vegetables.  ? Up to 6-8 servings of whole grains each day.  ? Up to 2 servings of lean meat, poultry, or fish each day. One serving of meat is equal to 3 oz. This is about the same size as a deck of cards.  ? 2 servings of low-fat dairy each day.  ? Heart-healthy fats. Healthy fats called omega-3 fatty acids are found in foods such as flaxseed and cold-water fish like sardines, salmon, and mackerel.  · Aim to eat 25-35 g (grams) of fiber a day. Foods that are high in fiber include apples, broccoli, carrots, beans, peas, and whole grains.  · Do not add salt or condiments that contain salt (such as soy sauce) to foods before eating.  · When eating at a restaurant, ask that your food be prepared with less salt or no salt, if possible.  · Try to eat 2 or more vegetarian meals each week.  · Eat more home-cooked food and eat less restaurant, buffet, and fast food.  Recommended foods  The items listed may not be a complete list. Talk with your dietitian about  what dietary choices are best for you.  Grains  Bread with less than 80 mg of sodium per slice. Whole-wheat pasta, quinoa, and brown rice. Oats and oatmeal. Barley. Millet. Grits and cream of wheat. Whole-grain and whole-wheat cold cereal.  Vegetables  All fresh vegetables. Vegetables that are frozen without sauce or added salt. Low-sodium or sodium-free canned vegetables.  Fruits  All fresh, frozen, and canned fruits. Dried fruits, such as raisins, prunes, and cranberries.  Meats and other protein foods  Lean cuts of meat. Skinless chicken and turkey. Fish with high omega-3 fatty acids, such as salmon, sardines, and other cold-water fishes. Eggs. Dried beans, peas, and edamame. Unsalted nuts and nut butters.  Dairy  Low-fat or nonfat (skim) milk and dried milk. Rice milk, soy milk, and almond milk. Low-fat or nonfat yogurt. Small amounts of reduced-sodium block cheese. Low-sodium cottage cheese.  Fats and oils  Olive, canola, soybean, flaxseed, or sunflower oil. Avocado.  Sweets and desserts  Apple sauce. Granola bars. Sugar-free pudding and gelatin. Frozen fruit bars.  Seasoning and other foods  Fresh and dried herbs. Lemon or lime juice. Vinegar. Low-sodium ketchup. Salt-free marinades, salad dressings, sauces, and seasonings.  Foods to avoid  The items listed may not be a complete list. Talk with your dietitian about what dietary choices are best for you.  Grains  Bread with more than 80 mg of sodium per slice. Hot or cold cereal with more than 140 mg sodium per serving. Salted pretzels and crackers. Pre-packaged breadcrumbs. Bagels, croissants, and biscuits.  Vegetables  Canned vegetables. Frozen vegetables with sauce or seasonings. Creamed vegetables. French fries. Onion rings. Pickled vegetables and sauerkraut.  Fruits  Fruits that are dried with sodium-containing preservatives.  Meats and other protein foods  Ribs and chicken wings. Caraballo, ham, pepperoni, bologna, salami, and packaged luncheon meats. Hot  dogs, bratwurst, and sausage. Canned meat. Smoked meat and fish. Salted nuts and seeds.  Dairy  Whole milk, half-and-half, and cream. Buttermilk. Processed cheese, cheese spreads, and cheese curds. Regular cottage cheese. Feta cheese. Shredded cheese. String cheese.  Fats and oils  Butter, lard, shortening, ghee, and arboleda fat. Canned and packaged gravies.  Seasoning and other foods  Onion salt, garlic salt, table salt, and sea salt. Marinades. Regular salad dressings. Relishes, pickles, and olives. Meat flavorings and tenderizers, and bouillon cubes. Horseradish, ketchup, and mustard. Worcestershire sauce. Teriyaki sauce, soy sauce (including reduced sodium). Hot sauce and Tabasco sauce. Steak sauce, fish sauce, oyster sauce, and cocktail sauce. Taco seasonings. Barbecue sauce. Tartar sauce.  Summary  · A heart failure eating plan includes changes that limit your intake of sodium and unhealthy fat, and it may help you lose weight or maintain a healthy weight. Your health care provider may also recommend limiting how much fluid you drink.  · Most people with heart failure should eat no more than 2,300 mg of salt (sodium) a day. The amount of sodium that is recommended for you may be lower, depending on your condition.  · Contact your health care provider or dietitian before making any major changes to your diet.  This information is not intended to replace advice given to you by your health care provider. Make sure you discuss any questions you have with your health care provider.  Document Released: 05/04/2018 Document Revised: 05/04/2018 Document Reviewed: 05/04/2018  [employer] Interactive Patient Education © 2019 [employer]  Heart Failure and Exercise  Heart failure is a condition in which the heart does not fill or pump enough blood and oxygen to support your body and its functions. Heart failure is a long-term (chronic) condition. Living with heart failure can be challenging. However, following your health  care provider's instructions about a healthy lifestyle may help improve your symptoms. This includes choosing the right exercise plan.  Doing daily physical activity is important after a diagnosis of heart failure. You may have some activity restrictions, so talk to your health care provider before doing any exercises.  What are the benefits of exercise?  Exercise may:  · Make your heart muscles stronger.  · Lower your blood pressure.  · Lower your cholesterol.  · Help you lose weight.  · Help your bones stay strong.  · Improve your blood circulation.  · Help your body use oxygen better. This relieves symptoms such as fatigue and shortness of breath.  · Help your mental health by lowering the risk of depression and other problems.  · Improve your quality of life.  · Decrease your chance of hospital admission for heart failure.    What is an exercise plan?  An exercise plan is a set of specific exercises and training activities. You will work with your health care provider to create the exercise plan that works for you. The plan may include:  · Different types of exercises and how to do them.  · Cardiac rehabilitation exercises. These are supervised programs that are designed to strengthen your heart.    What are strengthening exercises?  Strengthening exercises are a type of physical activity that involves using resistance to improve your muscle strength. Strengthening exercises usually have repetitive motions. These types of exercises can include:  · Lifting weights.  · Using weight machines.  · Using resistance tubes and bands.  · Using kettlebells.  · Using your body weight, such as doing push-ups or squats.    What are balance exercises?  Balance exercises are another type of physical activity. They strengthen the muscles of the back, stomach, and pelvis (core muscles) and improve your balance. They can also lower your risk of falling. These types of exercises can include:  · Standing on one leg.  · Walking  backward, sideways, and in a straight line.  · Standing up after sitting, without using your hands.  · Shifting your weight from one leg to the other.  · Lifting one leg in front of you.  · Doing yazmin chi. This is a type of exercise that uses slow movements and deep breathing.    How can I increase my flexibility?  Having better flexibility can keep you from falling. It can also lengthen your muscles, improve your range of motion, and help your joints. You can increase your flexibility by:  · Doing yazmin chi.  · Doing yoga.  · Stretching.    How much aerobic exercise should I get?  Aerobic exercises strengthen your breathing and circulation system and increase your body's use of oxygen. Examples of aerobic exercise include biking, walking, running, and swimming. Talk to your health care provider to find out how much aerobic exercise is safe for you.   To do these exercises:  · Start exercising slowly, limiting the amount of time at first. You may need to start with 5 minutes of aerobic exercise every day.  · Slowly add more minutes until you can safely do at least 30 minutes of exercise at least 4 days a week.    Summary  · Daily physical activity is important after a diagnosis of heart failure.  · Exercise can make your heart muscles stronger. It also offers other benefits that will improve your health.  · Talk to your health care provider before doing any exercises.  This information is not intended to replace advice given to you by your health care provider. Make sure you discuss any questions you have with your health care provider.  Document Released: 05/01/2018 Document Revised: 05/01/2018 Document Reviewed: 05/01/2018  [employer] Interactive Patient Education © 2019 [employer]  Heart Failure Medicines  Heart failure is a condition in which the heart cannot pump enough blood through the body. This can cause symptoms such as shortness of breath, fatigue, and confusion.  There are two types of heart  failure:  · Heart failure with reduced ejection fraction. In this type, the heart muscle is weak.  · Heart failure with preserved ejection fraction. In this type, the heart muscle does not fill with blood properly and may be stiff.    There is no cure for heart failure. However, being treated with medicines and following your health care provider's instructions about a healthy lifestyle can help you stay active, avoid problems, and live longer.  Talk to your health care provider about all medicines that you are taking, how often you should take them, and what possible problems (side effects) they may cause. Talk with your health care provider if you have difficulty affording your medicines.  What are some common medicines for heart failure?  The medicines that are prescribed for you will depend on your symptoms, the type of heart failure you have, and the cause of your heart failure. In some cases, you may need to take more than one medicine. You will be prescribed the following medicines according to your type of heart failure:  Heart failure with reduced ejection fraction  · Beta-blockers.  · Angiotensin-converting enzyme (ACE) inhibitors.  · Angiotensin II receptor blockers (ARBs).  · Angiotensin receptor neprilysin inhibitors (ARNIs).  · Aldosterone antagonists.  · Diuretics.  · Digoxin.  · Nitrates.  Heart failure with preserved ejection fraction  · Medicines to control blood pressure, including:  ? Beta-blockers.  ? Angiotensin-converting enzyme (ACE) inhibitors.  ? Angiotensin II receptor blockers (ARBs).  · Diuretics.  · Aldosterone antagonists.  What should I know about beta-blockers?  · These medicines lower your blood pressure and slow your heart rate. This helps to lessen your heart's workload.  · They can help to relieve chest pain (angina).  · They can help to improve your heart's ability to pump.  · They may cause asthma attacks and shortness of breath.  · Because these medicines slow your heart rate,  it is important not to overwork yourself while exercising. Talk to your health care provider about what your target heart rate should be while you exercise.  · These medicines can hide the symptoms of low blood sugar (glucose), which is also called hypoglycemia. If you have diabetes, make sure to check your blood glucose carefully. If you have hypoglycemia, talk to your health care provider about adjusting your medicines.  · Beta-blockers may make you feel dizzy or light-headed at first. Do not drive or use heavy machinery when you first start these medicines. Ask your health care provider when it is safe for you to drive.  What should I know about ACE inhibitors or ARBs?  · These medicines help to widen arteries and veins. This action lowers your blood pressure and lessens the strain on your heart, making it easier for your heart to pump.  · They can help to lessen the symptoms of heart failure.  · ARBs are often used if a person cannot take ACE inhibitors.  · ACE inhibitors may cause a dry cough.  · In rare cases, ACE inhibitors and ARBs can cause swelling of the tongue or lips, other swelling, taste problems, and skin rashes. If these symptoms occur, stop taking the medicines and contact your health care provider.  · Do not take ACE inhibitors if you are pregnant or may become pregnant. These medicines can cause health problems in an unborn baby.  · These medicines may cause dizziness. You may need regular checkups and blood tests to monitor how they are working.  What should I know about ARNIs?  · These medicines are a combination of an ARB and another medicine. They lower your blood pressure.  · Side effects may include dry cough, dizziness, low blood pressure, and kidney problems.  · Do not take ARNIs if you are already taking ACE inhibitors or ARBs.  · You may notice increased urination when taking these medicines.  · ARNIs can raise the amount of potassium in the blood. Your potassium levels will be  monitored regularly by your health care provider.  What should I know about aldosterone antagonists?  · They help the body to remove excess sodium through urination. This helps to lessen the amount of blood that the heart needs to pump.  · They can also help to lower blood pressure and improve the heart's ability to pump blood.  · They may cause dizziness, diarrhea, coughing, or flu-like symptoms.  · They should not be used if you have type 2 diabetes.  · They can raise the amount of potassium in the blood. Your potassium levels will be monitored regularly by your health care provider.  · These medicines can make men's breasts large and tender.  What should I know about diuretics?  · Diuretics are medicines that help the body get rid of excess fluid through urination. They can also help lessen your heart's workload.  · They help to lessen fluid buildup in the lungs, ankles, and feet.  · They help to lower your blood pressure.  · They can worsen problems with controlling urination (urinary incontinence).  · They may cause dizziness, headaches, muscle cramps, and an upset stomach.  · They can cause weak muscles, dry mouth, or confusion. It is important to drink plenty of fluids while taking these medicines, especially while exercising or on hot days.  What should I know about digoxin?  · Digoxin helps the heart pump more blood efficiently. It also lowers your heart rate.  · It can help ease heart failure symptoms and may be used if other medicines do not work.  · It can also help with irregular heartbeat (arrhythmia).  · It may cause stomach problems, fatigue, headache, drowsiness, or vision problems.  What should I know about nitrates?  · Nitrates relax the blood vessels and increase oxygen and blood supply to the heart. They also lower the blood pressure.  · They are usually taken to lessen chest pain.  · They may cause headaches, flushing, or irregular heartbeat.  Summary  · A healthy lifestyle and treatment with  medicine will relieve symptoms of heart failure.  · In some cases, you may need to take more than one medicine.  · It is important to talk to your health care provider about how often you should take your medicines. Do not skip a dose or change your dosage.  · Talk to your health care provider about possible side effects of these medicines.  This information is not intended to replace advice given to you by your health care provider. Make sure you discuss any questions you have with your health care provider.  Document Released: 05/04/2018 Document Revised: 05/04/2018 Document Reviewed: 05/04/2018  [employer] Interactive Patient Education © 2019 [employer]

## 2019-03-14 NOTE — PROGRESS NOTES
Chief Complaint   Patient presents with   • Follow-up     Cardiac Management.  Asa therapy. Reports he has quit all tobacco products since last office visit.  Still has occasional chest pains with palpitations but have gotten better since hospital stay.  Reports dizziness upon standing and sudden movements.  Shortness of breath with exertion.  Patient is currently wearing a lifevest.     • Med Refill     Pt brings all medications to office visit today.  Request refill on all cardiac medications.         Subjective       Roberth Kearns is a 63 y.o. male with HTN, hyperlipidemia, paroxysmal VT/PVCs, and  ischemic heart disease diagnosed in 2001 when he underwent bypass surgery followed by redo bypass surgery and multiple stentings.  Last follow up visit in January 2017, he continued to have chest discomfort. Ranexa was increased to 1000 mg BID and he was advised to undergo stress testing.  He had some problems with insurance and was unable to complete testing. At last visit, he continued to report chest pain relieved with NTG.  Cardiac workup with stress and echo advised.  Stress showed anterior infarct with ischemia and cath was advised. Diffuse distal disease was found with medical management advised as he had no target vessels to put the stents. At last visit, he did report continued chest pain and imdur was added. On 1/25/2019 he presented to [facility] emergency room with complaints of chest pain and shortness of breath.  EKG showed ST depression and cardiac enzymes were elevated.  He underwent cardiac catheterization that showed patent LIMA to LAD, patent saphenous vein graft to obtuse marginal, and 100% occlusion of his vein graft to PDA not amendable to PCI.  Medication management was advised including aspirin, Lipitor, Entresto, Aldactone, continue sotalol, and continue Effient.  Due to heart failure he was diuresed with Bumex.  A LifeVest was placed with plan for AICD if LVEF does not  improve.    Today he comes to the office for a hospital follow-up visit.  Since medication management changes for management of ischemic heart disease and heart failure, Roberth reports improvement of shortness of breath, no recurrence of chest pain, and improvement of edema.    HPI     Cardiac History:    Past Surgical History:   Procedure Laterality Date   • CARDIAC CATHETERIZATION  06/18/2007    Cath-Same Findings. PTCA of OM.   • CARDIAC CATHETERIZATION  08/26/2004    Cath-Patent Grafts-85%LCX at [facility].   • CARDIAC CATHETERIZATION  01/2002    Cath-Brachy Therapy of OM, Stent in RCA.   • CARDIAC CATHETERIZATION  06/2001    Cath-[place] in OM   • CARDIAC CATHETERIZATION  03/25/2016    90% SVG to AM- 2.5x9 & 2.25x12 ALEX. 80% SVG to OM- 3.5x8 ALEX. 100% SVG to PDA   • CARDIAC CATHETERIZATION  10/27/2017    Patent Grafts and stents. Diffuse distal Disease   • CARDIOVASCULAR STRESS TEST  07/22/2008    Stress- 6Min, 72%THR. Anterol Lateral Infarct with [place]uni Infarct Ischemia.   • CARDIOVASCULAR STRESS TEST  01/24/2013    L. Myoview-EF 40%. AnteroLateral Infarct with PerInfarct Ischemia.   • CARDIOVASCULAR STRESS TEST  09/22/2015    Lexiscan stress-mod fixed defect   • CARDIOVASCULAR STRESS TEST  10/10/2017    L.Myoview- EF 55%. Anterior Infarct with Ischemia. Small Inferior Ischemia.   • CATH LAB PROCEDURE  05/10/2013    Cath-90% SVG to OM-5.0 x 8 mm BMS.   • CONVERTED (HISTORICAL) HOLTER  06/28/2007    Holter-AVG HR 63 bpm, tow (3beats) runs of V-tach.   • CONVERTED (HISTORICAL) HOLTER  07/15/2013    Holter-AVGHR 56 BPM. 2 runs of V-tach.   • CORONARY ARTERY BYPASS GRAFT  04/2001    CABG-LIMA to LAD, SVG to RCA.   • CORONARY ARTERY BYPASS GRAFT  09/18/2002    Redo CABG-SVG to distal OM, SVG to PDA.   • ECHO - CONVERTED  06/18/2007    Echo-EF 45, Lateral WMA.   • ECHO - CONVERTED  11/03/2008    Echo-EF 45-49% Trace MR and MVP.   • ECHO - CONVERTED  01/24/2013    Echo-EF 45%   • ECHO - CONVERTED  09/23/2014     Echo-([facility]) EF 50%. Septal WMA.   • ECHO - CONVERTED  09/22/2015    Echo-EF 45-50%, RVSP 15 mmHg, mod MR.   • ECHO - CONVERTED  03/25/2016    EF 40%   • ECHO - CONVERTED  10/10/2017    EF 45-50%.Anterior WMA       Current Outpatient Medications   Medication Sig Dispense Refill   • albuterol sulfate  (90 Base) MCG/ACT inhaler Inhale 2 puffs Every 4 (Four) Hours As Needed for Wheezing.     • amLODIPine (NORVASC) 10 MG tablet TAKE ONE TABLET ONCE DAILY 30 tablet 6   • Arginine 500 MG capsule Take 1,000 mg by mouth 2 (Two) Times a Day.     • aspirin 81 MG EC tablet Take 81 mg by mouth daily. Decrease dose to once a day with Effient     • atorvastatin (LIPITOR) 20 MG tablet Take 1 tablet by mouth Daily. (Patient taking differently: Take 80 mg by mouth Daily.) 30 tablet 6   • bumetanide (BUMEX) 1 MG tablet Take 1 tablet by mouth Daily. 30 tablet 6   • cholecalciferol (VITAMIN D3) 1000 units tablet Take 1,000 Units by mouth 2 (Two) Times a Day.     • ezetimibe (ZETIA) 10 MG tablet Take 1 tablet by mouth Daily. 30 tablet 5   • fluticasone (FLOVENT HFA) 44 MCG/ACT inhaler Inhale 1 puff 2 (Two) Times a Day.     • gabapentin (NEURONTIN) 400 MG capsule Take 400 mg by mouth 3 (Three) Times a Day.     • HYDROcodone-acetaminophen (NORCO)  MG per tablet Take 1 tablet by mouth 4 (Four) Times a Day.     • levothyroxine (SYNTHROID, LEVOTHROID) 75 MCG tablet Take 1 tablet by mouth Daily. 30 tablet 5   • metFORMIN (GLUCOPHAGE) 500 MG tablet Take 500 mg by mouth 2 (Two) Times a Day With Meals.     • Niacin, Antihyperlipidemic, (NIACIN ER, ANTIHYPERLIPIDEMIC, PO) Take 500 mg by mouth Daily.     • nitroglycerin (NITROSTAT) 0.4 MG SL tablet Place 1 tablet under the tongue Every 5 (Five) Minutes As Needed for Chest Pain. Take no more than 3 doses in 15 minutes. 25 tablet 1   • Omega-3 Fatty Acids (FISH OIL) 1000 MG capsule capsule Take  by mouth 2 (two) times a day.     • pantoprazole (PROTONIX) 40 MG EC tablet Take 40 mg by  mouth Daily.     • potassium chloride (K-DUR,KLOR-CON) 20 MEQ CR tablet Take 1 tablet by mouth Daily. 30 tablet 6   • prasugrel (EFFIENT) 10 MG tablet Take 1 tablet by mouth Daily. 30 tablet 6   • ranolazine (RANEXA) 1000 MG 12 hr tablet Take 1 tablet by mouth Every 12 (Twelve) Hours. 60 tablet 6   • sotalol (BETAPACE) 80 MG tablet Take 1 tablet by mouth 2 (Two) Times a Day. 60 tablet 6   • spironolactone (ALDACTONE) 25 MG tablet Take 1 tablet by mouth Daily. 30 tablet 6   • sacubitril-valsartan (ENTRESTO)  MG tablet Take 1 tablet by mouth 2 (Two) Times a Day. 60 tablet 6     No current facility-administered medications for this visit.        Patient has no known allergies.    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)     mild   • Shortness of breath    • V tach (CMS/HCC)     PVC, on Sotalol       Social History     Socioeconomic History   • Marital status:      Spouse name: Not on file   • Number of children: Not on file   • Years of education: Not on file   • Highest education level: Not on file   Social Needs   • Financial resource strain: Not on file   • Food insecurity - worry: Not on file   • Food insecurity - inability: Not on file   • Transportation needs - medical: Not on file   • Transportation needs - non-medical: Not on file   Occupational History   • Not on file   Tobacco Use   • Smoking status: Former Smoker     Packs/day: 1.00     Years: 40.00     Pack years: 40.00   • Smokeless tobacco: Never Used   • Tobacco comment: counseled patient on effect's of tobacco use on health.   Substance and Sexual Activity   • Alcohol use: No   • Drug use: No   • Sexual activity: Not on file   Other Topics Concern   • Not on  "file   Social History Narrative   • Not on file       Family History   Problem Relation Age of Onset   • Hypertension Mother    • Cancer Father    • Heart disease Other    • Hyperlipidemia Other    • Hypertension Other    • Hypertension Other        Review of Systems   Constitution: Positive for malaise/fatigue (improved). Negative for decreased appetite.   HENT: Negative for congestion, hoarse voice, nosebleeds and sore throat.    Eyes: Negative for blurred vision.   Cardiovascular: Positive for leg swelling (improved, mild) and palpitations (improved). Negative for chest pain.   Respiratory: Positive for shortness of breath. Negative for sleep disturbances due to breathing.    Endocrine: Negative for cold intolerance and heat intolerance.   Hematologic/Lymphatic: Negative for bleeding problem. Does not bruise/bleed easily.   Skin: Negative for color change, dry skin and itching.   Musculoskeletal: Negative for muscle cramps, muscle weakness and myalgias.   Gastrointestinal: Negative for abdominal pain, change in bowel habit, dysphagia, heartburn, melena and nausea.   Genitourinary: Negative for dysuria and hematuria.   Neurological: Negative for dizziness and light-headedness.   Psychiatric/Behavioral: Negative for altered mental status and memory loss. The patient is nervous/anxious.    Allergic/Immunologic: Negative for hives and persistent infections.      Objective     /76   Pulse 58   Ht 182.9 cm (72.01\")   Wt 117 kg (258 lb)   BMI 34.98 kg/m²     Physical Exam   Constitutional: He is oriented to person, place, and time. Vital signs are normal. He appears well-nourished. No distress.   HENT:   Head: Normocephalic.   Eyes: Conjunctivae are normal. Pupils are equal, round, and reactive to light.   Neck: Normal range of motion. Neck supple. No JVD present. Carotid bruit is not present.   Cardiovascular: Normal rate, regular rhythm, S1 normal and S2 normal.   No murmur heard.  Pulmonary/Chest: Effort " normal and breath sounds normal. No respiratory distress. He has no wheezes. He has no rales.   Abdominal: Soft. Bowel sounds are normal. He exhibits no distension. There is no tenderness.   Musculoskeletal: Normal range of motion. He exhibits edema (mild in ankles).   Neurological: He is alert and oriented to person, place, and time.   Skin: Skin is warm and dry.   Psychiatric: He has a normal mood and affect.        ECG 12 Lead  Date/Time: 3/14/2019 11:05 AM  Performed by: Teresita Meza APRN  Authorized by: Teresita Meza APRN   Comparison: compared with previous ECG from 11/1/2018  Comparison to previous ECG: Sinus, first degree AV block, normal QTc  Rhythm: sinus rhythm  Ectopy: infrequent PVCs  BPM: 63  Conduction: 1st degree AV block  Comments:  ms, QTc 459 ms            Assessment/Plan      Roberth was seen today for follow-up and med refill.    Diagnoses and all orders for this visit:    Congestive heart failure with LV diastolic dysfunction, NYHA class 2 (CMS/HCC)  -     Adult Transthoracic Echo Limited W/ Cont if Necessary Per Protocol; Future    IHD (ischemic heart disease)  -     Adult Transthoracic Echo Limited W/ Cont if Necessary Per Protocol; Future    Essential hypertension    Hypercholesteremia    Ventricular tachycardia, paroxysmal (CMS/HCC)    PVC (premature ventricular contraction)    First degree AV block    Long term current use of antiarrhythmic drug  -     Adult Transthoracic Echo Limited W/ Cont if Necessary Per Protocol; Future    Localized edema    Smoking    Other orders  -     ECG 12 Lead  -     sacubitril-valsartan (ENTRESTO)  MG tablet; Take 1 tablet by mouth 2 (Two) Times a Day.    Roberth is wearing LifeVest without issues at this time.  To reassess LV ejection fraction at 3-month timeframe post cardiac catheterization, a limited echocardiogram scheduled for next month.  We will then see him in office to discuss results and further plan of care including possible need  for AICD.    Roberth admits to taking ACE inhibitor in the form of Mavik.  He understands to stop the ACE inhibitor in favor of continuing Entresto.  His blood pressure is high normal, therefore we will try increasing the dose of Entresto for optimal heart failure management outcomes.  At this time advised to continue the same dose of Norvasc, Aldactone, and Bumex.  He continues to have mild edema of his lower legs but much improved since medication changes.    EKG for management of sotalol therapy was done today showing sinus rhythm with first-degree block and acceptable QTC being 459 ms.  One PVC noted.  The same dose of sotalol advised at this time.    For ischemic heart disease he is taking daily aspirin without problem.  Continue the same.    Patient's Body mass index is 34.98 kg/m². BMI is above normal parameters. Recommendations include: nutrition counseling.  Dietary management for heart failure information and BMI information given to Roberth.  We also discussed low impact cardiovascular exercise.  A handout on physical activity commendations for patients with heart disease also given to him.    For management of hypercholesterolemia, Roberth is taking high intensity statin in the form of Lipitor at 80 mg daily.  I did not give a lab order to reassess lipid panel at this time, will consider next visit.     I advised Roberth of the risks of continuing to use tobacco, and I provided him with tobacco cessation educational materials in the After Visit Summary.   During this visit, I spent < 3 minutes counseling the patient regarding tobacco cessation.  He currently has stopped smoking cigarettes and is using an e-cigarette to wean off nicotine use. I complemented his efforts.     Follow-up visit scheduled requested after results of limited echo done next month.  He agrees to have blood pressure checked within the next week and call results to the office.  Should he develop any symptoms related to increased dose of  Entresto he understands to call.           Electronically signed by MIGUELITO Grant,  March 15, 2019 9:47 AM

## 2019-03-19 ENCOUNTER — TELEPHONE (OUTPATIENT)
Dept: CARDIOLOGY | Facility: CLINIC | Age: 64
End: 2019-03-19

## 2019-03-19 NOTE — TELEPHONE ENCOUNTER
Patient reports this morning upon waking, before meds he was having some chest pain and weakness.  He checked his BP with his home monitor.  BP 96/66 HR 37   Today around noon /65.  He reports HR was low again (upper 30's) when he woke up from a nap.  He walked around house and HR went up to 61.    He wears oxygen at night.  He reports he is supposed to go by Dr. Lemos's office regarding getting a new CPAP.    We discussed medicines.  He was still taking Mavik.  I advised him that this was stopped at his recent visit and he cannot take with Entresto.  He has not picked up the new dose of Entresto 97/103 mg BID.    He is taking the Entresto 49/51 mg BID.

## 2019-03-19 NOTE — TELEPHONE ENCOUNTER
I have tried to call patient x 2, no answer.  I left a detailed VM regarding medication changes.  I will try patient back again in morning.

## 2019-03-19 NOTE — TELEPHONE ENCOUNTER
We can decrease Sotalol to 1/2 tablet bid, bring in for EKG and BP check in 2-3 days. He needs to stop Mavik. Keep same dose entresto 49/52 until in for EKG and BP check.

## 2019-03-20 ENCOUNTER — CLINICAL SUPPORT (OUTPATIENT)
Dept: CARDIOLOGY | Facility: CLINIC | Age: 64
End: 2019-03-20

## 2019-03-20 VITALS — SYSTOLIC BLOOD PRESSURE: 100 MMHG | DIASTOLIC BLOOD PRESSURE: 68 MMHG | HEART RATE: 61 BPM

## 2019-03-20 DIAGNOSIS — Z79.899 ENCOUNTER FOR MONITORING SOTALOL THERAPY: Primary | ICD-10-CM

## 2019-03-20 DIAGNOSIS — Z51.81 ENCOUNTER FOR MONITORING SOTALOL THERAPY: Primary | ICD-10-CM

## 2019-03-20 DIAGNOSIS — I44.0 FIRST DEGREE AV BLOCK: ICD-10-CM

## 2019-03-20 NOTE — TELEPHONE ENCOUNTER
I spoke with patient.  Today after morning meds BP 83/58, HR 61  Patient has stopped the Mavik.    We discussed the recommendations of do not  the higher dose of Entresto.  Continue the 49/51 mg BID.  We also discussed to decrease his Sotalol to 1/2 tab BID due to episodes of  HR in upper 30's.    I advised patient to come by here this afternoon and bring home monitor.    >Patient feels like these problems started since pulmonology added Stiolto respimat inhaler.

## 2019-03-21 ENCOUNTER — TELEPHONE (OUTPATIENT)
Dept: CARDIOLOGY | Facility: CLINIC | Age: 64
End: 2019-03-21

## 2019-03-21 ENCOUNTER — LAB (OUTPATIENT)
Dept: LAB | Facility: HOSPITAL | Age: 64
End: 2019-03-21

## 2019-03-21 DIAGNOSIS — Z79.899 MEDICATION MANAGEMENT: ICD-10-CM

## 2019-03-21 DIAGNOSIS — R53.83 OTHER FATIGUE: ICD-10-CM

## 2019-03-21 DIAGNOSIS — I50.30 CONGESTIVE HEART FAILURE WITH LV DIASTOLIC DYSFUNCTION, NYHA CLASS 2 (HCC): ICD-10-CM

## 2019-03-21 DIAGNOSIS — Z79.899 LONG TERM CURRENT USE OF ANTIARRHYTHMIC DRUG: ICD-10-CM

## 2019-03-21 DIAGNOSIS — E87.1 ACUTE HYPONATREMIA: ICD-10-CM

## 2019-03-21 DIAGNOSIS — R06.02 SHORTNESS OF BREATH: ICD-10-CM

## 2019-03-21 DIAGNOSIS — I50.30 CONGESTIVE HEART FAILURE WITH LV DIASTOLIC DYSFUNCTION, NYHA CLASS 2 (HCC): Primary | ICD-10-CM

## 2019-03-21 PROCEDURE — 80053 COMPREHEN METABOLIC PANEL: CPT | Performed by: NURSE PRACTITIONER

## 2019-03-21 PROCEDURE — 36415 COLL VENOUS BLD VENIPUNCTURE: CPT

## 2019-03-21 PROCEDURE — 93000 ELECTROCARDIOGRAM COMPLETE: CPT | Performed by: NURSE PRACTITIONER

## 2019-03-21 PROCEDURE — 85027 COMPLETE CBC AUTOMATED: CPT | Performed by: NURSE PRACTITIONER

## 2019-03-21 RX ORDER — AMLODIPINE BESYLATE 5 MG/1
5 TABLET ORAL DAILY
Qty: 30 TABLET | Refills: 3 | Status: SHIPPED | OUTPATIENT
Start: 2019-03-21 | End: 2019-08-13 | Stop reason: ALTCHOICE

## 2019-03-21 NOTE — TELEPHONE ENCOUNTER
EKG was similar to prior EKG, TN interval slightly improved. Continue Sotalol therapy. If he was still on 80 mg bid, then continue same and monitor heart rate. His blood pressure is low. Advise to decrease Norvasc from 10 mg to 5 mg and monitor BP. If remains low we may have to discontinue. Thank you

## 2019-03-21 NOTE — PROGRESS NOTES
Procedure     ECG 12 Lead  Date/Time: 3/21/2019 7:58 AM  Performed by: Teresita Meza APRN  Authorized by: Teresita Meza APRN   Comparison: compared with previous ECG from 3/14/2019  Similar to previous ECG  Comparison to previous ECG: Sinus with PVC. QTc 243 ms  Rhythm: sinus rhythm  BPM: 61  Conduction: 1st degree AV block  Comments: QTc 443 ms

## 2019-03-21 NOTE — TELEPHONE ENCOUNTER
Patient aware of EKG results.  He is aware to continue Sotalol 80 mg BID and to decrease Norvasc to 5 mg daily.  He is aware to have lab work done at [facility] today.    Script for Entresto 49/51 mg BID and Norvasc 5 mg daily sent to [facility] with instructions to cancel the Entresto 97/103 mg script.      Patient reports he had labs with PCP about 2 weeks ago.  I have called and requested a copy be faxed to us.

## 2019-03-22 LAB
ALBUMIN SERPL-MCNC: 4.2 G/DL (ref 3.5–5.2)
ALBUMIN/GLOB SERPL: 1.6 G/DL
ALP SERPL-CCNC: 71 U/L (ref 39–117)
ALT SERPL W P-5'-P-CCNC: 24 U/L (ref 1–41)
ANION GAP SERPL CALCULATED.3IONS-SCNC: 12.7 MMOL/L
AST SERPL-CCNC: 22 U/L (ref 1–40)
BILIRUB SERPL-MCNC: 0.6 MG/DL (ref 0.2–1.2)
BUN BLD-MCNC: 20 MG/DL (ref 8–23)
BUN/CREAT SERPL: 16.7 (ref 7–25)
CALCIUM SPEC-SCNC: 9.2 MG/DL (ref 8.6–10.5)
CHLORIDE SERPL-SCNC: 92 MMOL/L (ref 98–107)
CO2 SERPL-SCNC: 25.3 MMOL/L (ref 22–29)
CREAT BLD-MCNC: 1.2 MG/DL (ref 0.76–1.27)
DEPRECATED RDW RBC AUTO: 41.2 FL (ref 37–54)
ERYTHROCYTE [DISTWIDTH] IN BLOOD BY AUTOMATED COUNT: 12.7 % (ref 12.3–15.4)
GFR SERPL CREATININE-BSD FRML MDRD: 62 ML/MIN/1.73
GLOBULIN UR ELPH-MCNC: 2.7 GM/DL
GLUCOSE BLD-MCNC: 111 MG/DL (ref 65–99)
HCT VFR BLD AUTO: 42 % (ref 37.5–51)
HGB BLD-MCNC: 14.3 G/DL (ref 13–17.7)
MCH RBC QN AUTO: 30.7 PG (ref 26.6–33)
MCHC RBC AUTO-ENTMCNC: 34 G/DL (ref 31.5–35.7)
MCV RBC AUTO: 90.1 FL (ref 79–97)
PLATELET # BLD AUTO: 181 10*3/MM3 (ref 140–450)
PMV BLD AUTO: 10.2 FL (ref 6–12)
POTASSIUM BLD-SCNC: 5.1 MMOL/L (ref 3.5–5.2)
PROT SERPL-MCNC: 6.9 G/DL (ref 6–8.5)
RBC # BLD AUTO: 4.66 10*6/MM3 (ref 4.14–5.8)
SODIUM BLD-SCNC: 130 MMOL/L (ref 136–145)
WBC NRBC COR # BLD: 9.48 10*3/MM3 (ref 3.4–10.8)

## 2019-04-10 ENCOUNTER — TELEPHONE (OUTPATIENT)
Dept: CARDIOLOGY | Facility: CLINIC | Age: 64
End: 2019-04-10

## 2019-04-10 NOTE — TELEPHONE ENCOUNTER
"Patient reports for the last 4-5 days having more shortness of breath.  He reports his HR today has been upper 40's by his home monitor.  /80's.  He reports the other day at another dr visit, his HR was 61.  He denies LE edema.  No fever or chills.  He does not weigh himself regularly, but he said he does not feel like he is retaining fluid, but \"just so short of breath all the time\"    His meds are up to date on our med list.      "

## 2019-04-11 ENCOUNTER — CLINICAL SUPPORT (OUTPATIENT)
Dept: CARDIOLOGY | Facility: CLINIC | Age: 64
End: 2019-04-11

## 2019-04-11 ENCOUNTER — TELEPHONE (OUTPATIENT)
Dept: CARDIOLOGY | Facility: CLINIC | Age: 64
End: 2019-04-11

## 2019-04-11 DIAGNOSIS — I51.9 LV DYSFUNCTION: ICD-10-CM

## 2019-04-11 DIAGNOSIS — R06.02 SHORTNESS OF BREATH: ICD-10-CM

## 2019-04-11 DIAGNOSIS — R00.2 PALPITATIONS: ICD-10-CM

## 2019-04-11 DIAGNOSIS — I51.9 LV DYSFUNCTION: Primary | ICD-10-CM

## 2019-04-11 PROCEDURE — 0296T PR EXT ECG > 48HR TO 21 DAY RCRD W/CONECT INTL RCRD: CPT | Performed by: INTERNAL MEDICINE

## 2019-04-11 NOTE — TELEPHONE ENCOUNTER
He needs to have echocardiogram, I do not see that he had it done in March. We can also place a 72 hour cardiac monitor. I will place order. Thank you.

## 2019-04-11 NOTE — TELEPHONE ENCOUNTER
His echo is scheduled 4/29/19.      This was in the scheduling notes on order:  Scheduled for April 25th or after. Needs to be 3 months post cath that was done 1/25/19    Keep the echo then?

## 2019-04-11 NOTE — TELEPHONE ENCOUNTER
Yes that is right. I looked at date wrong. Thank you. When here for monitor placement,  check BP also. Thank you.

## 2019-04-11 NOTE — TELEPHONE ENCOUNTER
BP is stable. We will continue same until results of cardiac monitor and echo available. Thank you.

## 2019-04-11 NOTE — TELEPHONE ENCOUNTER
Patient was in for monitor placement. His /58, he said that since starting Entresto he is very tired and feels worse.

## 2019-04-16 ENCOUNTER — OUTSIDE FACILITY SERVICE (OUTPATIENT)
Dept: CARDIOLOGY | Facility: CLINIC | Age: 64
End: 2019-04-16

## 2019-04-16 PROCEDURE — 0298T PR EXT ECG > 48HR TO 21 DAY REVIEW AND INTERPRETATN: CPT | Performed by: INTERNAL MEDICINE

## 2019-04-17 DIAGNOSIS — I49.3 PVC'S (PREMATURE VENTRICULAR CONTRACTIONS): Primary | ICD-10-CM

## 2019-04-17 DIAGNOSIS — Z79.899 MEDICATION MANAGEMENT: ICD-10-CM

## 2019-04-18 ENCOUNTER — OUTSIDE FACILITY SERVICE (OUTPATIENT)
Dept: CARDIOLOGY | Facility: CLINIC | Age: 64
End: 2019-04-18

## 2019-04-18 PROCEDURE — 99223 1ST HOSP IP/OBS HIGH 75: CPT | Performed by: INTERNAL MEDICINE

## 2019-04-18 PROCEDURE — 93306 TTE W/DOPPLER COMPLETE: CPT | Performed by: INTERNAL MEDICINE

## 2019-04-19 ENCOUNTER — OUTSIDE FACILITY SERVICE (OUTPATIENT)
Dept: CARDIOLOGY | Facility: CLINIC | Age: 64
End: 2019-04-19

## 2019-04-19 PROCEDURE — 99232 SBSQ HOSP IP/OBS MODERATE 35: CPT | Performed by: INTERNAL MEDICINE

## 2019-04-22 ENCOUNTER — TELEPHONE (OUTPATIENT)
Dept: CARDIOLOGY | Facility: CLINIC | Age: 64
End: 2019-04-22

## 2019-04-22 NOTE — TELEPHONE ENCOUNTER
"Patient called to inquire about follow up appointments, also is asking is it ok to get something for his nerves, states he has anxiety and \"fear that  I wont wake up\" . Wanted to seee if Ok before speaking with PCP .    Spoke with Tracy FARLEY , she states he can speak with his PCP for something for increased anxiety.    Called and made patient aware of recommendations  "

## 2019-04-23 RX ORDER — NITROGLYCERIN 0.4 MG/1
TABLET SUBLINGUAL
Qty: 25 TABLET | Refills: 1 | Status: SHIPPED | OUTPATIENT
Start: 2019-04-23 | End: 2019-06-20 | Stop reason: SDUPTHER

## 2019-04-29 ENCOUNTER — HOSPITAL ENCOUNTER (OUTPATIENT)
Dept: CARDIOLOGY | Facility: HOSPITAL | Age: 64
Discharge: HOME | End: 2019-04-29
Admitting: NURSE PRACTITIONER

## 2019-04-29 DIAGNOSIS — I25.9 IHD (ISCHEMIC HEART DISEASE): ICD-10-CM

## 2019-04-29 DIAGNOSIS — Z79.899 LONG TERM CURRENT USE OF ANTIARRHYTHMIC DRUG: ICD-10-CM

## 2019-04-29 DIAGNOSIS — I50.30 CONGESTIVE HEART FAILURE WITH LV DIASTOLIC DYSFUNCTION, NYHA CLASS 2 (HCC): ICD-10-CM

## 2019-04-29 LAB
BH CV ECHO MEAS - ACS: 2.1 CM
BH CV ECHO MEAS - AO ROOT AREA (BSA CORRECTED): 1.4
BH CV ECHO MEAS - AO ROOT AREA: 8.7 CM^2
BH CV ECHO MEAS - AO ROOT DIAM: 3.3 CM
BH CV ECHO MEAS - BSA(HAYCOCK): 2.5 M^2
BH CV ECHO MEAS - BSA: 2.4 M^2
BH CV ECHO MEAS - BZI_BMI: 35 KILOGRAMS/M^2
BH CV ECHO MEAS - BZI_METRIC_HEIGHT: 182.9 CM
BH CV ECHO MEAS - BZI_METRIC_WEIGHT: 117 KG
BH CV ECHO MEAS - EDV(CUBED): 174.1 ML
BH CV ECHO MEAS - EDV(MOD-SP4): 139 ML
BH CV ECHO MEAS - EDV(TEICH): 152.7 ML
BH CV ECHO MEAS - EF(CUBED): 17.9 %
BH CV ECHO MEAS - EF(MOD-SP4): 10.8 %
BH CV ECHO MEAS - EF(TEICH): 14.1 %
BH CV ECHO MEAS - ESV(CUBED): 142.9 ML
BH CV ECHO MEAS - ESV(MOD-SP4): 124 ML
BH CV ECHO MEAS - ESV(TEICH): 131.1 ML
BH CV ECHO MEAS - FS: 6.4 %
BH CV ECHO MEAS - IVS/LVPW: 1
BH CV ECHO MEAS - IVSD: 1.2 CM
BH CV ECHO MEAS - LA DIMENSION: 5.3 CM
BH CV ECHO MEAS - LA/AO: 1.6
BH CV ECHO MEAS - LV DIASTOLIC VOL/BSA (35-75): 58.6 ML/M^2
BH CV ECHO MEAS - LV MASS(C)D: 287.1 GRAMS
BH CV ECHO MEAS - LV MASS(C)DI: 120.9 GRAMS/M^2
BH CV ECHO MEAS - LV SYSTOLIC VOL/BSA (12-30): 52.2 ML/M^2
BH CV ECHO MEAS - LVIDD: 5.6 CM
BH CV ECHO MEAS - LVIDS: 5.2 CM
BH CV ECHO MEAS - LVLD AP4: 8.7 CM
BH CV ECHO MEAS - LVLS AP4: 8 CM
BH CV ECHO MEAS - LVOT AREA (M): 4.5 CM^2
BH CV ECHO MEAS - LVOT AREA: 4.6 CM^2
BH CV ECHO MEAS - LVOT DIAM: 2.4 CM
BH CV ECHO MEAS - LVPWD: 1.2 CM
BH CV ECHO MEAS - RVDD: 2.8 CM
BH CV ECHO MEAS - SI(CUBED): 13.2 ML/M^2
BH CV ECHO MEAS - SI(MOD-SP4): 6.3 ML/M^2
BH CV ECHO MEAS - SI(TEICH): 9.1 ML/M^2
BH CV ECHO MEAS - SV(CUBED): 31.2 ML
BH CV ECHO MEAS - SV(MOD-SP4): 15 ML
BH CV ECHO MEAS - SV(TEICH): 21.5 ML
MAXIMAL PREDICTED HEART RATE: 159 BPM
STRESS TARGET HR: 135 BPM

## 2019-04-29 PROCEDURE — 93308 TTE F-UP OR LMTD: CPT

## 2019-04-29 PROCEDURE — 93308 TTE F-UP OR LMTD: CPT | Performed by: INTERNAL MEDICINE

## 2019-05-01 ENCOUNTER — TELEPHONE (OUTPATIENT)
Dept: CARDIOLOGY | Facility: CLINIC | Age: 64
End: 2019-05-01

## 2019-05-02 ENCOUNTER — TELEPHONE (OUTPATIENT)
Dept: CARDIOLOGY | Facility: CLINIC | Age: 64
End: 2019-05-02

## 2019-05-02 ENCOUNTER — OFFICE VISIT (OUTPATIENT)
Dept: CARDIOLOGY | Facility: CLINIC | Age: 64
End: 2019-05-02

## 2019-05-02 VITALS
HEIGHT: 72 IN | WEIGHT: 247 LBS | BODY MASS INDEX: 33.46 KG/M2 | DIASTOLIC BLOOD PRESSURE: 60 MMHG | SYSTOLIC BLOOD PRESSURE: 94 MMHG | HEART RATE: 64 BPM

## 2019-05-02 DIAGNOSIS — Z79.899 LONG TERM CURRENT USE OF ANTIARRHYTHMIC DRUG: ICD-10-CM

## 2019-05-02 DIAGNOSIS — I51.9 LV DYSFUNCTION: ICD-10-CM

## 2019-05-02 DIAGNOSIS — R07.89 CHEST DISCOMFORT: ICD-10-CM

## 2019-05-02 DIAGNOSIS — I49.3 PVC (PREMATURE VENTRICULAR CONTRACTION): ICD-10-CM

## 2019-05-02 DIAGNOSIS — I10 ESSENTIAL HYPERTENSION: ICD-10-CM

## 2019-05-02 DIAGNOSIS — I25.9 IHD (ISCHEMIC HEART DISEASE): ICD-10-CM

## 2019-05-02 DIAGNOSIS — Z87.891 FORMER CIGARETTE SMOKER: ICD-10-CM

## 2019-05-02 DIAGNOSIS — R06.02 SHORTNESS OF BREATH: ICD-10-CM

## 2019-05-02 DIAGNOSIS — I50.23 ACUTE ON CHRONIC SYSTOLIC CHF (CONGESTIVE HEART FAILURE) (HCC): Primary | ICD-10-CM

## 2019-05-02 DIAGNOSIS — E78.5 HYPERLIPIDEMIA LDL GOAL <70: ICD-10-CM

## 2019-05-02 DIAGNOSIS — F41.9 ANXIETY: ICD-10-CM

## 2019-05-02 DIAGNOSIS — I50.21 ACUTE SYSTOLIC CHF (CONGESTIVE HEART FAILURE) (HCC): Primary | ICD-10-CM

## 2019-05-02 DIAGNOSIS — E66.9 OBESITY (BMI 30.0-34.9): ICD-10-CM

## 2019-05-02 DIAGNOSIS — E11.9 TYPE 2 DIABETES MELLITUS WITHOUT COMPLICATION, WITHOUT LONG-TERM CURRENT USE OF INSULIN (HCC): ICD-10-CM

## 2019-05-02 PROCEDURE — 93000 ELECTROCARDIOGRAM COMPLETE: CPT | Performed by: NURSE PRACTITIONER

## 2019-05-02 PROCEDURE — 99214 OFFICE O/P EST MOD 30 MIN: CPT | Performed by: NURSE PRACTITIONER

## 2019-05-02 RX ORDER — RANOLAZINE 1000 MG/1
1000 TABLET, EXTENDED RELEASE ORAL 2 TIMES DAILY
COMMUNITY
End: 2019-08-13 | Stop reason: SDUPTHER

## 2019-05-02 RX ORDER — ATORVASTATIN CALCIUM 80 MG/1
80 TABLET, FILM COATED ORAL DAILY
COMMUNITY

## 2019-05-02 RX ORDER — BUMETANIDE 2 MG/1
2 TABLET ORAL DAILY
COMMUNITY
End: 2019-08-13 | Stop reason: HOSPADM

## 2019-05-02 NOTE — TELEPHONE ENCOUNTER
Need to check BNP.  I may need to change his anti- arrhythmic and I need to see if he is in acute heart failure.  Order in.

## 2019-05-13 ENCOUNTER — TELEPHONE (OUTPATIENT)
Dept: CARDIOLOGY | Facility: CLINIC | Age: 64
End: 2019-05-13

## 2019-05-13 NOTE — TELEPHONE ENCOUNTER
Patient called and reports that over the weekend he had some pain between his shoulder blades and some shortness of breath. He states that he gained 4 lbs over night and took an extra bumex 2 mg and reports that weight went back down to normal and that pain subsided. He reports that since he had device put in that he has not had any palpitations and states that pulse has been running around 68/ to 80 and that blood pressure has been normal. He has not had BNP done yet, so I reminded him to try to go do that tomorrow, he states that he will. He wants to know if it is ok if he takes an extra bumex 2 mg for fluid. I told him that I would ask you, but that you will also need to look at BNP.

## 2019-05-13 NOTE — TELEPHONE ENCOUNTER
Patient was made aware that he can take 1 mg extra if needed, but that he does need to do the labs. Patient states that he will have done.

## 2019-05-14 ENCOUNTER — LAB (OUTPATIENT)
Dept: LAB | Facility: HOSPITAL | Age: 64
End: 2019-05-14

## 2019-05-14 DIAGNOSIS — I50.23 ACUTE ON CHRONIC SYSTOLIC CHF (CONGESTIVE HEART FAILURE) (HCC): ICD-10-CM

## 2019-05-14 PROCEDURE — 83880 ASSAY OF NATRIURETIC PEPTIDE: CPT | Performed by: NURSE PRACTITIONER

## 2019-05-15 ENCOUNTER — TELEPHONE (OUTPATIENT)
Dept: CARDIOLOGY | Facility: CLINIC | Age: 64
End: 2019-05-15

## 2019-05-15 DIAGNOSIS — I50.21 ACUTE SYSTOLIC CHF (CONGESTIVE HEART FAILURE) (HCC): Primary | ICD-10-CM

## 2019-05-15 LAB — NT-PROBNP SERPL-MCNC: 1699 PG/ML (ref 5–900)

## 2019-05-15 RX ORDER — AMIODARONE HYDROCHLORIDE 200 MG/1
200 TABLET ORAL 2 TIMES DAILY
Qty: 60 TABLET | Refills: 5 | Status: SHIPPED | OUTPATIENT
Start: 2019-05-15 | End: 2019-08-13 | Stop reason: ALTCHOICE

## 2019-05-15 RX ORDER — METOLAZONE 2.5 MG/1
TABLET ORAL
Qty: 3 TABLET | Refills: 0 | Status: SHIPPED | OUTPATIENT
Start: 2019-05-15 | End: 2019-11-12 | Stop reason: ALTCHOICE

## 2019-05-15 NOTE — PROGRESS NOTES
He needs to d/c the sotalol.  After it has been stopped for 3 days, he may change to amiodarone. Start with 200mg BID.  After he starts it, he needs EKG within 3 days.  Also, please have him to add low dose zaroloxyn 2.5mg for 3 days before bumex dosing.  Recheck BNP and BMP in one week after.  We can fax order to lab of choice or send to our lab, let me know which he prefers.

## 2019-05-15 NOTE — TELEPHONE ENCOUNTER
Orders in        ----- Message from Anny Pinedo LPN sent at 5/15/2019  6:11 PM EDT -----  See telephone encounter on 05/15/19. Patient states that he will go to our lab next week to have BNP and BMP.

## 2019-05-15 NOTE — TELEPHONE ENCOUNTER
Patient was made aware of d/c sotalol and for it to be stopped 3 days before starting amiodarone 200 mg BID. Patient was made aware to come in for EKG on 05/21/19 at 3 pm. Patient was made aware to add zaroxolyn 2.5 mg for 3 days before dose of bumex. Scripts sent to [facility].

## 2019-05-15 NOTE — TELEPHONE ENCOUNTER
----- Message from MIGUELITO Chamorro sent at 5/15/2019  5:23 PM EDT -----  He needs to d/c the sotalol.  After it has been stopped for 3 days, he may change to amiodarone. Start with 200mg BID.  After he starts it, he needs EKG within 3 days.  Also, please have him to add low dose zaroloxyn 2.5mg for 3 days before bumex dos  ing.  Recheck BNP and BMP in one week after.  We can fax order to lab of choice or send to our lab, let me know which he prefers.

## 2019-05-16 ENCOUNTER — TELEPHONE (OUTPATIENT)
Dept: CARDIOLOGY | Facility: CLINIC | Age: 64
End: 2019-05-16

## 2019-05-16 NOTE — TELEPHONE ENCOUNTER
Patient called and stated that this morning his ankles are swollen and he has had some shortness of breath. He states that his weight is up about 1 lb more than normal. He has not picked up zaroxolyn yet, but after he takes a short walk this morning he is going to the pharmacy to get. Patient states that he will start tomorrow.     I confirmed that sotalol had been stopped as of today and patient has taken out of medications. Patient was reminded to start amiodarone 200 mg BID on Sunday.

## 2019-05-20 ENCOUNTER — TELEPHONE (OUTPATIENT)
Dept: CARDIOLOGY | Facility: CLINIC | Age: 64
End: 2019-05-20

## 2019-05-20 NOTE — TELEPHONE ENCOUNTER
Patient called clarifying coming tomorrow for EKG at 3 pm and was reminded to go to lab on Wednesday or Thursday for labs to be redrawn.     He states that he did start amiodarone 200 mg BID yesterday and it has made him nauseated. Patient states that he has not had any food around the time of taking, patient was encouraged to have some food in stomach when taking to see if it helps.     Patient states that he is back down to 247 lb, which is the weight he was in office on 05/20/19, but states that he has been around 256 lb.    I made patient aware that I would let you know.

## 2019-05-21 ENCOUNTER — CLINICAL SUPPORT (OUTPATIENT)
Dept: CARDIOLOGY | Facility: CLINIC | Age: 64
End: 2019-05-21

## 2019-05-21 ENCOUNTER — TELEPHONE (OUTPATIENT)
Dept: CARDIOLOGY | Facility: CLINIC | Age: 64
End: 2019-05-21

## 2019-05-21 DIAGNOSIS — I47.29 NSVT (NONSUSTAINED VENTRICULAR TACHYCARDIA) (HCC): Primary | ICD-10-CM

## 2019-05-21 PROCEDURE — 93000 ELECTROCARDIOGRAM COMPLETE: CPT | Performed by: NURSE PRACTITIONER

## 2019-05-21 RX ORDER — ONDANSETRON 4 MG/1
4 TABLET, ORALLY DISINTEGRATING ORAL 3 TIMES DAILY PRN
Qty: 21 TABLET | Refills: 0 | Status: SHIPPED | OUTPATIENT
Start: 2019-05-21

## 2019-05-21 NOTE — TELEPHONE ENCOUNTER
Patient is aware to decrease dose of amiodarone to 100 mg BID. Patient is going to go tomorrow or Thursday of this week to have repeat labs done.     Will send in script for zofran ODT 4 mg TID PRN for 7 days.    Patient is coming in Friday (05/23/19) at 11 am.

## 2019-05-21 NOTE — TELEPHONE ENCOUNTER
Please make sure he is aware to decrease amiodarone to 100mg BID.  Also, has he had repeat labs since the zaroxolyn therapy or is that to come?  He may be dry.  Zofran ODT 4mg prn TID x7 days.  Please send to pharmacy of choice.

## 2019-05-21 NOTE — PROGRESS NOTES
Procedure     ECG 12 Lead  Date/Time: 5/21/2019 6:09 PM  Performed by: Tracy Holloway APRN  Authorized by: Tracy Holloway APRN   Comparison: compared with previous ECG from 5/2/2019  Comparison to previous ECG: QT now normal, still first degree block.  No PVC  Rhythm: sinus rhythm  BPM: 69  Conduction: incomplete left bundle branch block and 1st degree AV block

## 2019-05-22 ENCOUNTER — LAB (OUTPATIENT)
Dept: LAB | Facility: HOSPITAL | Age: 64
End: 2019-05-22

## 2019-05-22 DIAGNOSIS — I50.21 ACUTE SYSTOLIC CHF (CONGESTIVE HEART FAILURE) (HCC): ICD-10-CM

## 2019-05-22 PROCEDURE — 83880 ASSAY OF NATRIURETIC PEPTIDE: CPT | Performed by: NURSE PRACTITIONER

## 2019-05-22 PROCEDURE — 80048 BASIC METABOLIC PNL TOTAL CA: CPT | Performed by: NURSE PRACTITIONER

## 2019-05-22 PROCEDURE — 36415 COLL VENOUS BLD VENIPUNCTURE: CPT

## 2019-05-23 ENCOUNTER — TELEPHONE (OUTPATIENT)
Dept: CARDIOLOGY | Facility: CLINIC | Age: 64
End: 2019-05-23

## 2019-05-23 LAB
ANION GAP SERPL CALCULATED.3IONS-SCNC: 16.8 MMOL/L
BUN BLD-MCNC: 14 MG/DL (ref 8–23)
BUN/CREAT SERPL: 11.4 (ref 7–25)
CALCIUM SPEC-SCNC: 9.9 MG/DL (ref 8.6–10.5)
CHLORIDE SERPL-SCNC: 87 MMOL/L (ref 98–107)
CO2 SERPL-SCNC: 29.2 MMOL/L (ref 22–29)
CREAT BLD-MCNC: 1.23 MG/DL (ref 0.76–1.27)
GFR SERPL CREATININE-BSD FRML MDRD: 60 ML/MIN/1.73
GLUCOSE BLD-MCNC: 125 MG/DL (ref 65–99)
NT-PROBNP SERPL-MCNC: 1103 PG/ML (ref 5–900)
POTASSIUM BLD-SCNC: 4.8 MMOL/L (ref 3.5–5.2)
SODIUM BLD-SCNC: 133 MMOL/L (ref 136–145)

## 2019-05-23 NOTE — TELEPHONE ENCOUNTER
Patient called and states that he has still been having nausea even with the zofran and taking amiodarone 100 mg BID. He states that he started having some chest pain on Tuesday night, but did not go to the ER. He states that the chest pains have gone away. Patient states that he did not take amiodarone last night and has not taken this morning either.     I made patient aware that I would talk with you and see if you had any recommendations. It looks like patient had labs drawn yesterday, but they are not back yet.     I also encouraged patient to go to the ER should chest pain return.

## 2019-05-23 NOTE — TELEPHONE ENCOUNTER
Patient was made aware to come on Tuesday (05/28/19) at 3:15 pm for EKG.    Patient states that he has not had any chest pain today.

## 2019-05-23 NOTE — TELEPHONE ENCOUNTER
Patient is aware that he can stop amiodarone. He is aware that you will be talking with Dr. Alfonso about what he recommends. He states that he is still taking Ranexa 1000 mg BID and has tried Imdur in the past, but was unable to tolerate due to headache. Does patient still need to come tomorrow for EKG?

## 2019-05-23 NOTE — TELEPHONE ENCOUNTER
Just stop amiodarone.  I will see if T wants to start something different.  I need to see his labs also. Is he intolerant to imdur and ranexa?

## 2019-05-24 ENCOUNTER — TELEPHONE (OUTPATIENT)
Dept: CARDIOLOGY | Facility: CLINIC | Age: 64
End: 2019-05-24

## 2019-05-24 RX ORDER — CARVEDILOL 3.12 MG/1
3.12 TABLET ORAL 2 TIMES DAILY
Qty: 60 TABLET | Refills: 1 | Status: SHIPPED | OUTPATIENT
Start: 2019-05-24 | End: 2019-06-20 | Stop reason: SDUPTHER

## 2019-05-28 ENCOUNTER — CLINICAL SUPPORT (OUTPATIENT)
Dept: CARDIOLOGY | Facility: CLINIC | Age: 64
End: 2019-05-28

## 2019-05-28 VITALS — SYSTOLIC BLOOD PRESSURE: 150 MMHG | DIASTOLIC BLOOD PRESSURE: 98 MMHG

## 2019-05-28 DIAGNOSIS — I49.3 PVC (PREMATURE VENTRICULAR CONTRACTION): ICD-10-CM

## 2019-05-28 DIAGNOSIS — I10 ESSENTIAL HYPERTENSION: Primary | ICD-10-CM

## 2019-05-28 PROCEDURE — 93000 ELECTROCARDIOGRAM COMPLETE: CPT | Performed by: NURSE PRACTITIONER

## 2019-05-28 RX ORDER — ISOSORBIDE MONONITRATE 30 MG/1
30 TABLET, EXTENDED RELEASE ORAL DAILY
Qty: 30 TABLET | Refills: 11 | Status: SHIPPED | OUTPATIENT
Start: 2019-05-28 | End: 2019-08-13 | Stop reason: ALTCHOICE

## 2019-05-28 NOTE — PROGRESS NOTES
Patient here today for EKG and BP check.  Elevated HR and BP noted.  He has been without his coreg several days.  He has been having elevated heart rate and blood pressure.  He was advised to restart coreg and imdur at night at 30mg for chest pain.

## 2019-05-28 NOTE — PROGRESS NOTES
Procedure     ECG 12 Lead  Date/Time: 5/28/2019 5:23 PM  Performed by: Tracy Holloway APRN  Authorized by: Tracy Holloway APRN   Comparison: compared with previous ECG from 5/21/2019  Comparison to previous ECG: Rate much faster up from 60  Rhythm: sinus rhythm  Ectopy: unifocal PVCs  BPM: 92  Conduction: 1st degree AV block    Clinical impression: abnormal EKG  Comments: Normal QT

## 2019-05-31 ENCOUNTER — TELEPHONE (OUTPATIENT)
Dept: CARDIOLOGY | Facility: CLINIC | Age: 64
End: 2019-05-31

## 2019-05-31 ENCOUNTER — CLINICAL SUPPORT (OUTPATIENT)
Dept: CARDIOLOGY | Facility: CLINIC | Age: 64
End: 2019-05-31

## 2019-05-31 DIAGNOSIS — I50.30 CONGESTIVE HEART FAILURE WITH LV DIASTOLIC DYSFUNCTION, NYHA CLASS 3 (HCC): Primary | ICD-10-CM

## 2019-05-31 DIAGNOSIS — I44.7 NEW ONSET LEFT BUNDLE BRANCH BLOCK (LBBB): ICD-10-CM

## 2019-05-31 DIAGNOSIS — R06.02 SHORTNESS OF BREATH: Primary | ICD-10-CM

## 2019-05-31 DIAGNOSIS — I20.9 ANGINA PECTORIS (HCC): ICD-10-CM

## 2019-05-31 DIAGNOSIS — I42.0 CARDIOMYOPATHY, DILATED (HCC): Primary | ICD-10-CM

## 2019-05-31 PROCEDURE — 93000 ELECTROCARDIOGRAM COMPLETE: CPT | Performed by: NURSE PRACTITIONER

## 2019-05-31 NOTE — TELEPHONE ENCOUNTER
Talked to T, he said he also needs to have a chest xray and to make sure he is added to the soonest pacer check we can get him on.  He agreed with KARLA.

## 2019-05-31 NOTE — TELEPHONE ENCOUNTER
Patient is on for pacer check on 06/05/19 at 12:15 pm. Patient is also aware to have chest xray. Patient will come by on Monday to get order.

## 2019-05-31 NOTE — PROGRESS NOTES
Procedure     ECG 12 Lead  Date/Time: 5/31/2019 11:08 AM  Performed by: Tracy Holloway APRN  Authorized by: Tracy Holloway APRN   Comparison: compared with previous ECG from 5/28/2019  Comparison to previous ECG: Rate is better, QRS is widening now 144 up from 125ms  Rhythm: sinus rhythm  BPM: 76  Conduction: left bundle branch block and 1st degree AV block    Clinical impression: abnormal EKG  Comments: Normal QT

## 2019-06-05 ENCOUNTER — OFFICE VISIT (OUTPATIENT)
Dept: CARDIOLOGY | Facility: CLINIC | Age: 64
End: 2019-06-05

## 2019-06-05 ENCOUNTER — TELEPHONE (OUTPATIENT)
Dept: CARDIOLOGY | Facility: CLINIC | Age: 64
End: 2019-06-05

## 2019-06-05 DIAGNOSIS — I51.9 LV DYSFUNCTION: Primary | ICD-10-CM

## 2019-06-05 PROCEDURE — 93289 INTERROG DEVICE EVAL HEART: CPT | Performed by: INTERNAL MEDICINE

## 2019-06-05 NOTE — TELEPHONE ENCOUNTER
Patient called and left voicemail stating that he doesn't feel well and that his heart rate is in the 40s. Patient did have ICD check done today.

## 2019-06-05 NOTE — TELEPHONE ENCOUNTER
Patient was made aware that ICD check was reviewed and that HR has been staying between 60-80.     Patient was made aware that if he feels bad to go to the ER to be checked out.

## 2019-06-05 NOTE — TELEPHONE ENCOUNTER
Reviewed check.  His heart rate is in the 60-80 range.  Never has been below that.  I can see his trend.

## 2019-06-06 ENCOUNTER — TELEPHONE (OUTPATIENT)
Dept: CARDIOLOGY | Facility: CLINIC | Age: 64
End: 2019-06-06

## 2019-06-06 DIAGNOSIS — J43.8 OTHER EMPHYSEMA (HCC): ICD-10-CM

## 2019-06-06 DIAGNOSIS — J98.6 DISORDER OF DIAPHRAGM: ICD-10-CM

## 2019-06-06 DIAGNOSIS — I50.21 ACUTE SYSTOLIC CHF (CONGESTIVE HEART FAILURE) (HCC): ICD-10-CM

## 2019-06-06 DIAGNOSIS — R06.02 SHORTNESS OF BREATH: Primary | ICD-10-CM

## 2019-06-06 DIAGNOSIS — I25.9 IHD (ISCHEMIC HEART DISEASE): ICD-10-CM

## 2019-06-12 ENCOUNTER — HOSPITAL ENCOUNTER (OUTPATIENT)
Dept: CARDIOLOGY | Facility: HOSPITAL | Age: 64
Discharge: HOME | End: 2019-06-12

## 2019-06-12 ENCOUNTER — LAB (OUTPATIENT)
Dept: LAB | Facility: HOSPITAL | Age: 64
End: 2019-06-12

## 2019-06-12 DIAGNOSIS — I20.9 ANGINA PECTORIS (HCC): ICD-10-CM

## 2019-06-12 DIAGNOSIS — I49.3 PVC'S (PREMATURE VENTRICULAR CONTRACTIONS): ICD-10-CM

## 2019-06-12 DIAGNOSIS — Z79.899 MEDICATION MANAGEMENT: ICD-10-CM

## 2019-06-12 DIAGNOSIS — I44.7 NEW ONSET LEFT BUNDLE BRANCH BLOCK (LBBB): ICD-10-CM

## 2019-06-12 DIAGNOSIS — I50.30 CONGESTIVE HEART FAILURE WITH LV DIASTOLIC DYSFUNCTION, NYHA CLASS 3 (HCC): ICD-10-CM

## 2019-06-12 LAB
ANION GAP SERPL CALCULATED.3IONS-SCNC: 13.7 MMOL/L
BUN BLD-MCNC: 21 MG/DL (ref 8–23)
BUN/CREAT SERPL: 21 (ref 7–25)
CALCIUM SPEC-SCNC: 9.3 MG/DL (ref 8.6–10.5)
CHLORIDE SERPL-SCNC: 89 MMOL/L (ref 98–107)
CO2 SERPL-SCNC: 28.3 MMOL/L (ref 22–29)
CREAT BLD-MCNC: 1 MG/DL (ref 0.76–1.27)
GFR SERPL CREATININE-BSD FRML MDRD: 76 ML/MIN/1.73
GLUCOSE BLD-MCNC: 116 MG/DL (ref 65–99)
MAGNESIUM SERPL-MCNC: 1.7 MG/DL (ref 1.6–2.4)
MAXIMAL PREDICTED HEART RATE: 159 BPM
POTASSIUM BLD-SCNC: 4.5 MMOL/L (ref 3.5–5.2)
SODIUM BLD-SCNC: 131 MMOL/L (ref 136–145)
STRESS TARGET HR: 135 BPM
TSH SERPL DL<=0.05 MIU/L-ACNC: 0.79 MIU/ML (ref 0.27–4.2)

## 2019-06-12 PROCEDURE — 80048 BASIC METABOLIC PNL TOTAL CA: CPT | Performed by: NURSE PRACTITIONER

## 2019-06-12 PROCEDURE — 84443 ASSAY THYROID STIM HORMONE: CPT | Performed by: NURSE PRACTITIONER

## 2019-06-12 PROCEDURE — A9560 TC99M LABELED RBC: HCPCS | Performed by: INTERNAL MEDICINE

## 2019-06-12 PROCEDURE — 78481 HEART FIRST PASS SINGLE: CPT

## 2019-06-12 PROCEDURE — 78481 HEART FIRST PASS SINGLE: CPT | Performed by: INTERNAL MEDICINE

## 2019-06-12 PROCEDURE — 36415 COLL VENOUS BLD VENIPUNCTURE: CPT

## 2019-06-12 PROCEDURE — 0 TECHNETIUM LABELED RED BLOOD CELLS: Performed by: INTERNAL MEDICINE

## 2019-06-12 PROCEDURE — 83735 ASSAY OF MAGNESIUM: CPT | Performed by: NURSE PRACTITIONER

## 2019-06-12 RX ADMIN — HEPARIN SODIUM (PORCINE) LOCK FLUSH IV SOLN 100 UNIT/ML 10 UNITS: 100 SOLUTION at 15:31

## 2019-06-12 RX ADMIN — TECHNETIUM TC 99M-LABELED RED BLOOD CELLS 1 DOSE: KIT at 15:31

## 2019-06-14 RX ORDER — ADHESIVE TAPE 3"X 2.3 YD
200 TAPE, NON-MEDICATED TOPICAL DAILY
Qty: 30 TABLET | Refills: 11 | Status: SHIPPED | OUTPATIENT
Start: 2019-06-14 | End: 2019-08-13 | Stop reason: HOSPADM

## 2019-06-17 ENCOUNTER — TELEPHONE (OUTPATIENT)
Dept: CARDIOLOGY | Facility: CLINIC | Age: 64
End: 2019-06-17

## 2019-06-17 DIAGNOSIS — I51.9 LEFT VENTRICULAR DYSFUNCTION: ICD-10-CM

## 2019-06-17 DIAGNOSIS — I50.21 ACUTE SYSTOLIC CHF (CONGESTIVE HEART FAILURE) (HCC): Primary | ICD-10-CM

## 2019-06-17 NOTE — TELEPHONE ENCOUNTER
Please see if we can find an old CT of chest.  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  Unsure if this is new.  I need another CT to compare.  See if we can pull one from the hospital.  My only thought is ? Changing his imdur to bidil.  I will wait till labs come back.

## 2019-06-17 NOTE — TELEPHONE ENCOUNTER
Was that done by Teresita on the 13th?  I see a note from her in there.  Also he needs labs before hand.  His heart failure if progressing with EF now at 11% down from 20-25% prior.  For now, he needs to stay on current.  What is his blood pressure?  I will order a BNP as well.

## 2019-06-17 NOTE — TELEPHONE ENCOUNTER
I made patient aware that order for BiV was in to Dr. Diamond. I called Dr. Diamond's office and Eric states that Dr. Diamond will review MUGA to see how we need to proceed, whether he needs to see patient in office before or not. MUGA was sent.    I made patient aware that he needs to continue current medications. He states that BP has been staying around 90/60 and HR is around 74.     He states that he has been having a lot of swelling in his legs and was wondering what else can be done to help. He was made aware of orders for lab work. He was encouraged to go to the lab to have done tomorrow as we would be able to make further recommendations based on those.

## 2019-06-17 NOTE — TELEPHONE ENCOUNTER
Patient called and asked if he could go back on sotalol because he does not think that coreg 3.125 mg BID is helping, he states that his heart rate is staying between 80 to 100. He was made aware that we needed to stop sotalol due to CHF, but he did want me to ask you if it could be changed.    Patient was made aware of MUGA scan results and that referral back to Dr. Diamond was recommended for evaluation for upgrade to BiV. Could you place referral?    Patient is also asking when he needs appointment to see our office?

## 2019-06-18 ENCOUNTER — TELEPHONE (OUTPATIENT)
Dept: CARDIOLOGY | Facility: CLINIC | Age: 64
End: 2019-06-18

## 2019-06-18 ENCOUNTER — LAB (OUTPATIENT)
Dept: LAB | Facility: HOSPITAL | Age: 64
End: 2019-06-18

## 2019-06-18 DIAGNOSIS — I50.21 ACUTE SYSTOLIC CHF (CONGESTIVE HEART FAILURE) (HCC): ICD-10-CM

## 2019-06-18 DIAGNOSIS — I50.23 ACUTE ON CHRONIC SYSTOLIC CHF (CONGESTIVE HEART FAILURE) (HCC): Primary | ICD-10-CM

## 2019-06-18 LAB
ALBUMIN SERPL-MCNC: 4.12 G/DL (ref 3.5–5.2)
ALBUMIN/GLOB SERPL: 1.3 G/DL
ALP SERPL-CCNC: 86 U/L (ref 39–117)
ALT SERPL W P-5'-P-CCNC: 24 U/L (ref 1–41)
ANION GAP SERPL CALCULATED.3IONS-SCNC: 13.3 MMOL/L
AST SERPL-CCNC: 21 U/L (ref 1–40)
BASOPHILS # BLD AUTO: 0.01 10*3/MM3 (ref 0–0.2)
BASOPHILS NFR BLD AUTO: 0.1 % (ref 0–1.5)
BILIRUB SERPL-MCNC: 0.6 MG/DL (ref 0.2–1.2)
BUN BLD-MCNC: 15 MG/DL (ref 8–23)
BUN/CREAT SERPL: 14.9 (ref 7–25)
CALCIUM SPEC-SCNC: 9.7 MG/DL (ref 8.6–10.5)
CHLORIDE SERPL-SCNC: 87 MMOL/L (ref 98–107)
CO2 SERPL-SCNC: 28.7 MMOL/L (ref 22–29)
CREAT BLD-MCNC: 1.01 MG/DL (ref 0.76–1.27)
DEPRECATED RDW RBC AUTO: 45.4 FL (ref 37–54)
EOSINOPHIL # BLD AUTO: 0.13 10*3/MM3 (ref 0–0.4)
EOSINOPHIL NFR BLD AUTO: 1.5 % (ref 0.3–6.2)
ERYTHROCYTE [DISTWIDTH] IN BLOOD BY AUTOMATED COUNT: 13.2 % (ref 12.3–15.4)
GFR SERPL CREATININE-BSD FRML MDRD: 75 ML/MIN/1.73
GLOBULIN UR ELPH-MCNC: 3.2 GM/DL
GLUCOSE BLD-MCNC: 126 MG/DL (ref 65–99)
HCT VFR BLD AUTO: 40.1 % (ref 37.5–51)
HGB BLD-MCNC: 13.5 G/DL (ref 13–17.7)
IMM GRANULOCYTES # BLD AUTO: 0.01 10*3/MM3 (ref 0–0.05)
IMM GRANULOCYTES NFR BLD AUTO: 0.1 % (ref 0–0.5)
LYMPHOCYTES # BLD AUTO: 1.99 10*3/MM3 (ref 0.7–3.1)
LYMPHOCYTES NFR BLD AUTO: 23.6 % (ref 19.6–45.3)
MCH RBC QN AUTO: 31.7 PG (ref 26.6–33)
MCHC RBC AUTO-ENTMCNC: 33.7 G/DL (ref 31.5–35.7)
MCV RBC AUTO: 94.1 FL (ref 79–97)
MONOCYTES # BLD AUTO: 0.68 10*3/MM3 (ref 0.1–0.9)
MONOCYTES NFR BLD AUTO: 8 % (ref 5–12)
NEUTROPHILS # BLD AUTO: 5.63 10*3/MM3 (ref 1.7–7)
NEUTROPHILS NFR BLD AUTO: 66.7 % (ref 42.7–76)
NT-PROBNP SERPL-MCNC: 2321 PG/ML (ref 5–900)
PLATELET # BLD AUTO: 183 10*3/MM3 (ref 140–450)
PMV BLD AUTO: 10.4 FL (ref 6–12)
POTASSIUM BLD-SCNC: 4.7 MMOL/L (ref 3.5–5.2)
PROT SERPL-MCNC: 7.3 G/DL (ref 6–8.5)
RBC # BLD AUTO: 4.26 10*6/MM3 (ref 4.14–5.8)
SODIUM BLD-SCNC: 129 MMOL/L (ref 136–145)
WBC NRBC COR # BLD: 8.45 10*3/MM3 (ref 3.4–10.8)

## 2019-06-18 PROCEDURE — 85025 COMPLETE CBC W/AUTO DIFF WBC: CPT | Performed by: NURSE PRACTITIONER

## 2019-06-18 PROCEDURE — 83880 ASSAY OF NATRIURETIC PEPTIDE: CPT | Performed by: NURSE PRACTITIONER

## 2019-06-18 PROCEDURE — 36415 COLL VENOUS BLD VENIPUNCTURE: CPT

## 2019-06-18 PROCEDURE — 80053 COMPREHEN METABOLIC PANEL: CPT | Performed by: NURSE PRACTITIONER

## 2019-06-18 NOTE — TELEPHONE ENCOUNTER
Order for BMP and BNP in.  Please let him know to start with 1/2 of the 1mg and may increase to a whole tablet if needed for weight gain.

## 2019-06-20 RX ORDER — AMLODIPINE BESYLATE 5 MG/1
TABLET ORAL
Qty: 30 TABLET | Refills: 3 | OUTPATIENT
Start: 2019-06-20

## 2019-06-20 RX ORDER — NITROGLYCERIN 0.4 MG/1
TABLET SUBLINGUAL
Qty: 25 TABLET | Refills: 1 | Status: SHIPPED | OUTPATIENT
Start: 2019-06-20 | End: 2019-08-16 | Stop reason: SDUPTHER

## 2019-06-20 RX ORDER — CARVEDILOL 3.12 MG/1
TABLET ORAL
Qty: 60 TABLET | Refills: 1 | Status: SHIPPED | OUTPATIENT
Start: 2019-06-20 | End: 2019-06-27 | Stop reason: ALTCHOICE

## 2019-06-24 ENCOUNTER — LAB (OUTPATIENT)
Dept: LAB | Facility: HOSPITAL | Age: 64
End: 2019-06-24

## 2019-06-24 DIAGNOSIS — I50.23 ACUTE ON CHRONIC SYSTOLIC CHF (CONGESTIVE HEART FAILURE) (HCC): ICD-10-CM

## 2019-06-24 LAB
ANION GAP SERPL CALCULATED.3IONS-SCNC: 15.3 MMOL/L
BUN BLD-MCNC: 15 MG/DL (ref 8–23)
BUN/CREAT SERPL: 16.5 (ref 7–25)
CALCIUM SPEC-SCNC: 9.4 MG/DL (ref 8.6–10.5)
CHLORIDE SERPL-SCNC: 93 MMOL/L (ref 98–107)
CO2 SERPL-SCNC: 26.7 MMOL/L (ref 22–29)
CREAT BLD-MCNC: 0.91 MG/DL (ref 0.76–1.27)
GFR SERPL CREATININE-BSD FRML MDRD: 85 ML/MIN/1.73
GLUCOSE BLD-MCNC: 106 MG/DL (ref 65–99)
NT-PROBNP SERPL-MCNC: 2401 PG/ML (ref 5–900)
POTASSIUM BLD-SCNC: 4.2 MMOL/L (ref 3.5–5.2)
SODIUM BLD-SCNC: 135 MMOL/L (ref 136–145)

## 2019-06-24 PROCEDURE — 80048 BASIC METABOLIC PNL TOTAL CA: CPT | Performed by: NURSE PRACTITIONER

## 2019-06-24 PROCEDURE — 36415 COLL VENOUS BLD VENIPUNCTURE: CPT

## 2019-06-24 PROCEDURE — 83880 ASSAY OF NATRIURETIC PEPTIDE: CPT | Performed by: NURSE PRACTITIONER

## 2019-06-25 NOTE — PROGRESS NOTES
Is he taking diuretic as ordered?  His numbers did not improve at all and actually are worse with the increased diuretic dosing.  It appears he did not diurese.  Please inquire.  LIMITED SODIUM has to be done.  Less than 2grams daily.

## 2019-06-26 ENCOUNTER — TELEPHONE (OUTPATIENT)
Dept: CARDIOLOGY | Facility: CLINIC | Age: 64
End: 2019-06-26

## 2019-06-26 DIAGNOSIS — I50.23 ACUTE ON CHRONIC SYSTOLIC CONGESTIVE HEART FAILURE, NYHA CLASS 3 (HCC): Primary | ICD-10-CM

## 2019-06-26 NOTE — TELEPHONE ENCOUNTER
When I spoke with patient about lab work, he stated that he wants to know when he will be put back on sotalol and taken off of carvedilol because since the change in medicine he has gone down hill and does not feel well at all and that he doesn't think he will be here much longer unless medication is changed back.     I explained to him that the reason for the change was due to him being in heart failure and sotalol was contraindicated in heart failure. He was also made aware that his current EF is 11% and his BNP is currently 2,400. He was explained that was the reason why he is going to see Dr. Diamond on 07/03/19 at 8 am to talk about upgrading to BiV-ICD.     Patient states that he doesn't think we know how bad he feels and that he knows it is due to being changed from sotalol to carvedilol and that he does not think it is due to the EF since he started feeling this way after the change.     I told patient that I would let you know and would call him back with your recommendations.

## 2019-06-26 NOTE — TELEPHONE ENCOUNTER
Please let him know, it is an absolute CONTRAINDICATION. He can not take it.  This is nothing to do with myself having a preference but what can clinically be done.  If he prefers, we can try and change coreg to metoprolol but sotalol is just not an option due to declining LV function.

## 2019-06-26 NOTE — TELEPHONE ENCOUNTER
Patient's wife, Caity, was made aware that we cannot change back to sotalol due to heart failure. She was made aware for patient to take Bumex 2 mg in the morning and a whole tablet of the 1 mg at night per recommendation on labs. She verbalized understanding.     I did explain to her that we could try to change coreg to metoprolol if she thought that patient would be willing. She said that he would be.

## 2019-06-27 RX ORDER — BUMETANIDE 1 MG/1
1 TABLET ORAL NIGHTLY
Qty: 30 TABLET | Refills: 1 | Status: SHIPPED | OUTPATIENT
Start: 2019-06-27 | End: 2019-07-19 | Stop reason: SDUPTHER

## 2019-06-27 NOTE — TELEPHONE ENCOUNTER
Patient aware of script for metoprolol 25 mg BID and to stop carvedilol. Patient also asked for script for bumex 1 mg daily to be sent to pharmacy as well.

## 2019-07-03 ENCOUNTER — TELEPHONE (OUTPATIENT)
Dept: CARDIOLOGY | Facility: CLINIC | Age: 64
End: 2019-07-03

## 2019-07-03 NOTE — TELEPHONE ENCOUNTER
Patient was made aware to go back on coreg 3.125 mg BID and was made aware to discontinue metoprolol 25 mg BID.    I will call Dr. Diamond's office on Friday to ask about labs.

## 2019-07-03 NOTE — TELEPHONE ENCOUNTER
Patient called and states that he is not tolerating metoprolol 25 mg BID well. He states that he has had no energy. He is wondering if you want him to start back on coreg?    He has not had lab work done yet. I told patient to go on Monday to have done since the lab will be closed tomorrow and Friday. He states that he is getting the upgrade of his ICD to the BiV-ICD on Monday, he saw Dr. Diamond today.    Do you want him to have labs at the hospital? Can they do that?

## 2019-07-08 ENCOUNTER — TELEPHONE (OUTPATIENT)
Dept: CARDIOLOGY | Facility: CLINIC | Age: 64
End: 2019-07-08

## 2019-07-08 NOTE — TELEPHONE ENCOUNTER
Patient's wife called this morning stating that patient could not tolerate coreg and went back on the metoprolol 25 mg BID.      Patient is having ICD upgrade today to BiV.

## 2019-07-11 ENCOUNTER — TELEPHONE (OUTPATIENT)
Dept: CARDIOLOGY | Facility: CLINIC | Age: 64
End: 2019-07-11

## 2019-07-11 NOTE — TELEPHONE ENCOUNTER
Patient reports that he has had some swelling in his legs and feet. He states that it has gone down and it coming back a little, but states that he may have had too much salt yesterday, but has been trying to limit.    He is going to have labs done on Monday that were ordered.     Patient has not been scheduled a returning appointment and had upgrade to BiV-ICD on Monday. When does he need to be seen?

## 2019-07-17 ENCOUNTER — LAB (OUTPATIENT)
Dept: LAB | Facility: HOSPITAL | Age: 64
End: 2019-07-17

## 2019-07-17 DIAGNOSIS — I50.23 ACUTE ON CHRONIC SYSTOLIC CONGESTIVE HEART FAILURE, NYHA CLASS 3 (HCC): ICD-10-CM

## 2019-07-17 PROCEDURE — 80053 COMPREHEN METABOLIC PANEL: CPT | Performed by: NURSE PRACTITIONER

## 2019-07-17 PROCEDURE — 83880 ASSAY OF NATRIURETIC PEPTIDE: CPT | Performed by: NURSE PRACTITIONER

## 2019-07-17 PROCEDURE — 36415 COLL VENOUS BLD VENIPUNCTURE: CPT

## 2019-07-18 LAB
ALBUMIN SERPL-MCNC: 4.35 G/DL (ref 3.5–5.2)
ALBUMIN/GLOB SERPL: 1.6 G/DL
ALP SERPL-CCNC: 72 U/L (ref 39–117)
ALT SERPL W P-5'-P-CCNC: 21 U/L (ref 1–41)
ANION GAP SERPL CALCULATED.3IONS-SCNC: 16 MMOL/L (ref 5–15)
AST SERPL-CCNC: 21 U/L (ref 1–40)
BILIRUB SERPL-MCNC: 0.8 MG/DL (ref 0.2–1.2)
BUN BLD-MCNC: 11 MG/DL (ref 8–23)
BUN/CREAT SERPL: 11 (ref 7–25)
CALCIUM SPEC-SCNC: 9.7 MG/DL (ref 8.6–10.5)
CHLORIDE SERPL-SCNC: 90 MMOL/L (ref 98–107)
CO2 SERPL-SCNC: 28 MMOL/L (ref 22–29)
CREAT BLD-MCNC: 1 MG/DL (ref 0.76–1.27)
GFR SERPL CREATININE-BSD FRML MDRD: 76 ML/MIN/1.73
GLOBULIN UR ELPH-MCNC: 2.8 GM/DL
GLUCOSE BLD-MCNC: 122 MG/DL (ref 65–99)
NT-PROBNP SERPL-MCNC: 1636 PG/ML (ref 5–900)
POTASSIUM BLD-SCNC: 4.4 MMOL/L (ref 3.5–5.2)
PROT SERPL-MCNC: 7.1 G/DL (ref 6–8.5)
SODIUM BLD-SCNC: 134 MMOL/L (ref 136–145)

## 2019-07-19 RX ORDER — BUMETANIDE 1 MG/1
TABLET ORAL
Qty: 30 TABLET | Refills: 1 | Status: SHIPPED | OUTPATIENT
Start: 2019-07-19 | End: 2019-08-13 | Stop reason: HOSPADM

## 2019-07-19 RX ORDER — SACUBITRIL AND VALSARTAN 49; 51 MG/1; MG/1
TABLET, FILM COATED ORAL
Qty: 60 TABLET | Refills: 3 | Status: SHIPPED | OUTPATIENT
Start: 2019-07-19 | End: 2019-11-12 | Stop reason: ALTCHOICE

## 2019-07-19 RX ORDER — RANOLAZINE 1000 MG/1
TABLET, FILM COATED, EXTENDED RELEASE ORAL
Qty: 60 TABLET | Refills: 6 | Status: SHIPPED | OUTPATIENT
Start: 2019-07-19 | End: 2019-11-12 | Stop reason: ALTCHOICE

## 2019-07-22 ENCOUNTER — TELEPHONE (OUTPATIENT)
Dept: CARDIOLOGY | Facility: CLINIC | Age: 64
End: 2019-07-22

## 2019-07-22 NOTE — TELEPHONE ENCOUNTER
Received phone call from Herminia leiva [facility] about patients Amlodipine . I made her aware that On his 5-2-19 office visit that the medication was discontinued . She states understanding

## 2019-08-13 ENCOUNTER — TELEPHONE (OUTPATIENT)
Dept: CARDIOLOGY | Facility: CLINIC | Age: 64
End: 2019-08-13

## 2019-08-13 ENCOUNTER — OFFICE VISIT (OUTPATIENT)
Dept: CARDIOLOGY | Facility: CLINIC | Age: 64
End: 2019-08-13

## 2019-08-13 VITALS
WEIGHT: 257 LBS | SYSTOLIC BLOOD PRESSURE: 130 MMHG | HEIGHT: 72 IN | HEART RATE: 71 BPM | DIASTOLIC BLOOD PRESSURE: 70 MMHG | BODY MASS INDEX: 34.81 KG/M2

## 2019-08-13 DIAGNOSIS — Z45.02 ENCOUNTER FOR MANAGEMENT OF BIVENTRICULAR IMPLANTABLE CARDIOVERTER-DEFIBRILLATOR (ICD): ICD-10-CM

## 2019-08-13 DIAGNOSIS — R06.02 SHORTNESS OF BREATH: ICD-10-CM

## 2019-08-13 DIAGNOSIS — I50.42 CHRONIC COMBINED SYSTOLIC AND DIASTOLIC CONGESTIVE HEART FAILURE, NYHA CLASS 3 (HCC): ICD-10-CM

## 2019-08-13 DIAGNOSIS — E78.2 MIXED HYPERLIPIDEMIA: ICD-10-CM

## 2019-08-13 DIAGNOSIS — I44.7 LBBB (LEFT BUNDLE BRANCH BLOCK): ICD-10-CM

## 2019-08-13 DIAGNOSIS — I10 ESSENTIAL HYPERTENSION: ICD-10-CM

## 2019-08-13 DIAGNOSIS — I49.3 PVC (PREMATURE VENTRICULAR CONTRACTION): ICD-10-CM

## 2019-08-13 DIAGNOSIS — R63.5 WEIGHT GAIN: ICD-10-CM

## 2019-08-13 DIAGNOSIS — E66.9 OBESITY (BMI 30.0-34.9): ICD-10-CM

## 2019-08-13 DIAGNOSIS — I51.9 LV DYSFUNCTION: Primary | ICD-10-CM

## 2019-08-13 DIAGNOSIS — E03.9 ACQUIRED HYPOTHYROIDISM: ICD-10-CM

## 2019-08-13 DIAGNOSIS — I25.9 IHD (ISCHEMIC HEART DISEASE): ICD-10-CM

## 2019-08-13 DIAGNOSIS — R60.0 EDEMA LEG: ICD-10-CM

## 2019-08-13 PROCEDURE — 93000 ELECTROCARDIOGRAM COMPLETE: CPT | Performed by: NURSE PRACTITIONER

## 2019-08-13 PROCEDURE — 99214 OFFICE O/P EST MOD 30 MIN: CPT | Performed by: NURSE PRACTITIONER

## 2019-08-13 RX ORDER — TORSEMIDE 10 MG/1
TABLET ORAL
Qty: 180 TABLET | Refills: 2 | Status: SHIPPED | OUTPATIENT
Start: 2019-08-13 | End: 2019-08-22 | Stop reason: ALTCHOICE

## 2019-08-13 NOTE — TELEPHONE ENCOUNTER
Herminia at [facility] was made aware that a tablet was fine for metoprolol succinate 50 mg and clarified that it was supposed to be a whole tablet in the morning and a 1/2 tablet in the evening.

## 2019-08-13 NOTE — TELEPHONE ENCOUNTER
Herminia with [facility] called asking for clarification on metoprolol succinate 50 mg. Do you want patient to have the sprinkle capsule or is a tablet ok? And also, should it be a whole tablet in the morning and 1/2 tablet at night?      The sprinkle capsule will require a PA, it is not covered on insurance.

## 2019-08-13 NOTE — PROGRESS NOTES
Chief Complaint   Patient presents with   • Follow-up     For cardiac management. Patient is on aspirin. Reports that he has had some sharp chest pain. Reports that he has been having a lot of weakness and some shortness of breath. Reports that he has had some palpitations. States that he doesn't feel great, but feels better than before. Reports that on the 3rd of this month at PCP his HR dropped into the 30s. Reports that his feet having been swelling again for the last couple of days. Last lab work was done on 07/17/19 per you, in chart under labs.   • Pacemaker Check     Patient had [employer] ICD upgraded to a BiV-ICD on 07/08/19 with Dr. Diamond. Implant report is in chart under media.    • Med Refill     Needs refills on cardiac medications. 30 day supplies to [facility].       Cardiac Complaints  dyspnea and lower extremity edema      Subjective   Roberth Kearns is a 63 y.o. male with HTN, hyperlipidemia, paroxysmal VT/PVCs, CHF class III combined dysfunction, LV dysfunction, and ischemic heart disease diagnosed in 2001 when he underwent bypass surgery followed by redo bypass surgery and multiple stentings. At follow up visit in January 2017, he continued to have chest discomfort and Ranexa was increased to 1000 mg BID and he was advised to undergo repeat stress testing.  He had some problems with insurance and was unable to complete testing. In November 2017, he continued to report chest pain relieved with NTG.  Cardiac workup with stress and echo advised.  Stress showed anterior infarct with ischemia and cath was advised. Diffuse distal disease was found with medical management advised as he had no target vessels to put the stents. In November 2018, he did report continued chest pain and imdur was added with ranexa dosing increased. On 1/25/2019 he presented to [facility] emergency room with complaints of chest pain and shortness of breath.  EKG showed ST depression and cardiac  enzymes were elevated.  He underwent cardiac catheterization that showed patent LIMA to LAD, patent saphenous vein graft to obtuse marginal, and 100% occlusion of his vein graft to PDA not amendable to PCI, and diminished LV function.  Medication management was advised including aspirin, Lipitor, Entresto, Aldactone, continue sotalol, and continue Effient.  Due to heart failure he was diuresed with Bumex.  A LifeVest was placed with plan for AICD with no EF improvement. Multiple medication changes then were made as hypotension became a concern.  Norvasc was steadily tapered.  He then later had a holter placed in April for palpitations that showed significant percentage of PVCs, totaling 10%.  Sotalol was continued.  He was readmitted to the hospital on 4/18/2019 for exacerbation of CHF and was diuresed, treated with inhalers, and urged to limit sodium intake. 90 day post cath echo on 4/29/2019 showed EF remained low at 21-25% and in May he was referred for ICD.  Sotalol was changed to amiodarone for PVC management as EF was low and CHF not well compensated.  It later had to be switched to coreg due to side effects and most recently metoprolol.  Patient then had an EKG done May 31st that showed widening of QRS.  MUGA was advised for abnormal EKG and symptoms of shortness of breath.  MUGA showed a drop in LV function to 11% and bi-v pacer was advised.  He then underwent [facility] Bi-V pacer upgrade with Dr. Diamond on 7/8/2019.      Patient returns today for follow up and reports immediately feeling better when ICD was upgraded to BI-V.  He states he felt as though he had more energy and was not nearly as winded as prior. He does report over the last week he has been very edematous and has had a hard time getting fluid to come off after he ate some canned chili.  He also reports he was seen by PCP who evaluated and stated HR noted at 30. He has not had BI-V check since upgrade.  He admits to feeling better but does  continue to have shortness of breath, noted weight gain, and bilateral lower extremity edema.  Patient also reports continued weakness with any activity, although no worse than prior.  Labs recently done in July show:  BNP 1636 improved from prior,   , BUN 11, Creatinine 1.0, Na 134, K 4.4, Ca 9.7, GFR 76. Cardiac refills requested for 30 day supply.        Cardiac History  Past Surgical History:   Procedure Laterality Date   • CARDIAC CATHETERIZATION  06/18/2007    Cath-Same Findings. PTCA of OM.   • CARDIAC CATHETERIZATION  08/26/2004    Cath-Patent Grafts-85%LCX at [facility].   • CARDIAC CATHETERIZATION  01/2002    Cath-Brachy Therapy of OM, Stent in RCA.   • CARDIAC CATHETERIZATION  06/2001    Cath-[place] in OM   • CARDIAC CATHETERIZATION  03/25/2016    90% SVG to AM- 2.5x9 & 2.25x12 ALEX. 80% SVG to OM- 3.5x8 ALEX. 100% SVG to PDA   • CARDIAC CATHETERIZATION  10/27/2017    Patent Grafts and stents. Diffuse distal Disease   • CARDIAC CATHETERIZATION  01/25/2019    Patent LIMA to LAD, SVG to OM. 100% SVG to PDA. EF 25-30%   • CARDIOVASCULAR STRESS TEST  07/22/2008    Stress- 6Min, 72%THR. Anterol Lateral Infarct with [place]uni Infarct Ischemia.   • CARDIOVASCULAR STRESS TEST  01/24/2013    L. Myoview-EF 40%. AnteroLateral Infarct with PerInfarct Ischemia.   • CARDIOVASCULAR STRESS TEST  09/22/2015    Lexiscan stress-mod fixed defect   • CARDIOVASCULAR STRESS TEST  10/10/2017    L.Myoview- EF 55%. Anterior Infarct with Ischemia. Small Inferior Ischemia.   • CATH LAB PROCEDURE  05/10/2013    Cath-90% SVG to OM-5.0 x 8 mm BMS.   • CONVERTED (HISTORICAL) HOLTER  06/28/2007    Holter-AVG HR 63 bpm, tow (3beats) runs of V-tach.   • CONVERTED (HISTORICAL) HOLTER  07/15/2013    Holter-AVGHR 56 BPM. 2 runs of V-tach.   • CONVERTED (HISTORICAL) HOLTER  04/16/2019    AVG HR 62 BPM.48-80 BPM. 101% PVC   • CONVERTED (HISTORICAL) HOLTER  04/11/2019    Baseline NSR, 10% PVC   • CORONARY ARTERY BYPASS GRAFT  04/2001     CABG-LIMA to LAD, SVG to RCA.   • CORONARY ARTERY BYPASS GRAFT  09/18/2002    Redo CABG-SVG to distal OM, SVG to PDA.   • ECHO - CONVERTED  06/18/2007    Echo-EF 45, Lateral WMA.   • ECHO - CONVERTED  11/03/2008    Echo-EF 45-49% Trace MR and MVP.   • ECHO - CONVERTED  01/24/2013    Echo-EF 45%   • ECHO - CONVERTED  09/23/2014    Echo-([facility]) EF 50%. Septal WMA.   • ECHO - CONVERTED  09/22/2015    Echo-EF 45-50%, RVSP 15 mmHg, mod MR.   • ECHO - CONVERTED  03/25/2016    EF 40%   • ECHO - CONVERTED  10/10/2017    EF 45-50%.Anterior WMA   • ECHO - CONVERTED  01/25/2019    @ [facility]. TLS. EF 35%. Mod MR.   • ECHO - CONVERTED  04/18/2019    @ [facility]. EF 25%. Inferior WMA. LA-5.0 Cm. RVSP- 37 mmHg.   • ECHO - CONVERTED  04/29/2019    Limited Echo. EF 20-25%. LA- 5.3 Cm   • OTHER SURGICAL HISTORY  06/12/2019    MUGA- LVEF 11%.       Current Outpatient Medications   Medication Sig Dispense Refill   • albuterol sulfate  (90 Base) MCG/ACT inhaler Inhale 2 puffs Every 4 (Four) Hours As Needed for Wheezing.     • Arginine 500 MG capsule Take 1,000 mg by mouth 2 (Two) Times a Day.     • aspirin 81 MG EC tablet Take 81 mg by mouth daily. Decrease dose to once a day with Effient     • atorvastatin (LIPITOR) 80 MG tablet Take 80 mg by mouth Daily.     • cholecalciferol (VITAMIN D3) 1000 units tablet Take 1,000 Units by mouth 2 (Two) Times a Day.     • ENTRESTO 49-51 MG tablet TAKE ONE TABLET BY MOUTH TWO TIMES A DAY FOR BLOOD PRESSURE AND HEART. DISCONTINUE ENTRESTO 97-103MG 60 tablet 3   • ezetimibe (ZETIA) 10 MG tablet Take 1 tablet by mouth Daily. 30 tablet 5   • fluticasone (FLOVENT HFA) 44 MCG/ACT inhaler Inhale 1 puff 2 (Two) Times a Day.     • gabapentin (NEURONTIN) 400 MG capsule Take 400 mg by mouth 3 (Three) Times a Day.     • HYDROcodone-acetaminophen (NORCO)  MG per tablet Take 1 tablet by mouth Every 4 (Four) Hours As Needed for Moderate Pain .     • levothyroxine (SYNTHROID, LEVOTHROID) 75 MCG tablet Take 1  tablet by mouth Daily. 30 tablet 5   • metFORMIN (GLUCOPHAGE) 500 MG tablet Take 500 mg by mouth Daily With Breakfast.     • Niacin, Antihyperlipidemic, (NIACIN ER, ANTIHYPERLIPIDEMIC, PO) Take 500 mg by mouth Daily.     • nitroglycerin (NITROSTAT) 0.4 MG SL tablet PLACE 1 TABLET UNDER TONGUE FOR CHEST PAIN MAY REPEAT EVERY 5 MINUTES. TAKE NO MORE THAN 3 DOSES IN 15 MINUTES 25 tablet 1   • Omega-3 Fatty Acids (FISH OIL) 1000 MG capsule capsule Take  by mouth 2 (two) times a day.     • ondansetron ODT (ZOFRAN ODT) 4 MG disintegrating tablet Take 1 tablet by mouth 3 (Three) Times a Day As Needed for Nausea or Vomiting. 21 tablet 0   • pantoprazole (PROTONIX) 40 MG EC tablet Take 40 mg by mouth Daily.     • potassium chloride (K-DUR,KLOR-CON) 20 MEQ CR tablet Take 1 tablet by mouth Daily. 30 tablet 6   • prasugrel (EFFIENT) 10 MG tablet Take 1 tablet by mouth Daily. 30 tablet 6   • RANEXA 1000 MG 12 hr tablet TAKE 1 TABLET BY MOUTH EVERY 12 (TWELVE) HOURS FOR THE HEART 60 tablet 6   • spironolactone (ALDACTONE) 25 MG tablet Take 1 tablet by mouth Daily. 30 tablet 6   • metOLazone (ZAROXOLYN) 2.5 MG tablet Take 1 tablet by mouth for 3 days 30 minutes before Bumex dose. 3 tablet 0   • Metoprolol Succinate 50 MG capsule extended-release 24 hour sprinkle Take 50 mg by mouth 2 (Two) Times a Day. One tablet in AM and 1/2  capsule 2   • torsemide (DEMADEX) 10 MG tablet 1 tablet everyday and may use additional if needed for fluid/weight gain 180 tablet 2     No current facility-administered medications for this visit.        Patient has no known allergies.    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)     mild   • Shortness of breath    • V  "tach (CMS/HCC)     PVC, on Sotalol       Social History     Socioeconomic History   • Marital status:      Spouse name: Not on file   • Number of children: Not on file   • Years of education: Not on file   • Highest education level: Not on file   Tobacco Use   • Smoking status: Former Smoker     Packs/day: 1.00     Years: 40.00     Pack years: 40.00   • Smokeless tobacco: Never Used   • Tobacco comment: counseled patient on effect's of tobacco use on health.   Substance and Sexual Activity   • Alcohol use: No   • Drug use: No       Family History   Problem Relation Age of Onset   • Hypertension Mother    • Cancer Father    • Heart disease Other    • Hyperlipidemia Other    • Hypertension Other    • Hypertension Other        Review of Systems   Constitution: Positive for weakness and malaise/fatigue.   Cardiovascular: Positive for dyspnea on exertion, irregular heartbeat and leg swelling. Negative for chest pain, claudication, near-syncope, palpitations and syncope.   Respiratory: Positive for shortness of breath. Negative for cough and wheezing.    Musculoskeletal: Negative for back pain and joint pain.   Gastrointestinal: Negative for anorexia, heartburn, nausea and vomiting.   Genitourinary: Positive for dysuria. Negative for hematuria, hesitancy and nocturia.   Neurological: Negative for dizziness, light-headedness and loss of balance.   Psychiatric/Behavioral: Negative for depression. The patient is nervous/anxious.            Objective     /70 (BP Location: Left arm)   Pulse 71   Ht 182.9 cm (72.01\")   Wt 117 kg (257 lb)   BMI 34.85 kg/m²     Physical Exam   Constitutional: He is oriented to person, place, and time. He appears well-developed and well-nourished.   HENT:   Head: Normocephalic and atraumatic.   Eyes: EOM are normal. Pupils are equal, round, and reactive to light.   Neck: Normal range of motion. Neck supple.   Cardiovascular: Normal rate. A regularly irregular rhythm present. "   Murmur heard.  Pulmonary/Chest: Effort normal and breath sounds normal.   Abdominal: Soft.   Musculoskeletal: Normal range of motion. He exhibits edema.   Neurological: He is alert and oriented to person, place, and time.   Skin: Skin is warm. There is pallor.   Psychiatric: He has a normal mood and affect. His behavior is normal.         ECG 12 Lead  Date/Time: 8/13/2019 2:49 PM  Performed by: Tracy Holloway APRN  Authorized by: Tracy Holloway APRN   Comparison: compared with previous ECG from 5/31/2019  Comparison to previous ECG: NSR at that visit with LBBB  Rhythm: paced  Ectopy: unifocal PVCs  BPM: 71  Conduction: left bundle branch block    Clinical impression: abnormal EKG  Comments:             Assessment/Plan     HR is stable with regularly irregular pulse noted.  Patient has PVCs that are not perfusing.  EKG done today for CHF class III combined failure and BI-V ICD shows a ventricular paced rhythm with intrinsic beats, LBBB, and 2 unifocal PVCs. Patient will be urged to increase magnesium therapy to 200mg BID. He will also be advised to d/c the metoprolol tartrate in favor of succinate for heart failure symptoms with dose increased to 50mg AM and 25mg PM.  [facility] Bi-V check advised. ASA continued for history of IHD. Repeat MUGA will also be done to reassess LV function as before BI-V placed, LV function noted at 11%.  Patient to continue on current entresto therapy as he could not tolerate higher dosing due to hypotension. If EF should remain low, referral to heart failure clinic will be considered. HTN well managed on current.  He does report more edema and shortness of breath than prior, he will be urged to limit sodium intake and to d/c bumex therapy in favor of demadex for diuresis.  Repeat labs will be advised to include BNP, CBC, CMP, and TSH at the same time as MUGA. Daily weight once again advised. If BNP should be elevated after switching to demadex possible increase of zaroxyolyn  will be advised.  Patient to continue with statin and zetia therapy for hyperlipidemia management as side effects are denied and tolerance reported.  FLP he reports with you as well as AIC for DM management.  BMI noted at 34.85 with a 10 pound weight gain since May.  Again, daily weight and limited fluids/sodium intake recommended. 3 month follow up advised or sooner if needed.         Problems Addressed this Visit        Cardiovascular and Mediastinum    LV dysfunction - Primary    Relevant Medications    Metoprolol Succinate 50 MG capsule extended-release 24 hour sprinkle    Other Relevant Orders    Nuclear Medicine Cardiac Blood Pool [facility]    CBC & Differential    Comprehensive Metabolic Panel    TSH    proBNP    HTN (hypertension)    Relevant Medications    torsemide (DEMADEX) 10 MG tablet    Metoprolol Succinate 50 MG capsule extended-release 24 hour sprinkle    IHD (ischemic heart disease)    Relevant Medications    Metoprolol Succinate 50 MG capsule extended-release 24 hour sprinkle       Endocrine    Hypothyroid    Relevant Medications    Metoprolol Succinate 50 MG capsule extended-release 24 hour sprinkle      Other Visit Diagnoses     Chronic combined systolic and diastolic congestive heart failure, NYHA class 3 (CMS/HCC)        Relevant Medications    Metoprolol Succinate 50 MG capsule extended-release 24 hour sprinkle    Other Relevant Orders    Nuclear Medicine Cardiac Blood Pool [facility]    CBC & Differential    Comprehensive Metabolic Panel    TSH    proBNP    ECG 12 Lead    Shortness of breath        Relevant Orders    Nuclear Medicine Cardiac Blood Pool [facility]    CBC & Differential    Comprehensive Metabolic Panel    TSH    proBNP    Edema leg        Relevant Orders    Nuclear Medicine Cardiac Blood Pool [facility]    CBC & Differential    Comprehensive Metabolic Panel    TSH    proBNP    PVC (premature ventricular contraction)        Relevant Medications     Metoprolol Succinate 50 MG capsule extended-release 24 hour sprinkle    Other Relevant Orders    CBC & Differential    Comprehensive Metabolic Panel    TSH    ECG 12 Lead    Mixed hyperlipidemia        Encounter for management of biventricular implantable cardioverter-defibrillator (ICD)        Weight gain        Obesity (BMI 30.0-34.9)              Patient's Body mass index is 34.85 kg/m². BMI is above normal parameters. Recommendations include: nutrition counseling.                  Electronically signed by MIGUELITO Herrmann August 13, 2019 3:31 PM

## 2019-08-16 RX ORDER — BUMETANIDE 1 MG/1
TABLET ORAL
Qty: 30 TABLET | Refills: 1 | OUTPATIENT
Start: 2019-08-16

## 2019-08-16 RX ORDER — NITROGLYCERIN 0.4 MG/1
TABLET SUBLINGUAL
Qty: 25 TABLET | Refills: 1 | Status: SHIPPED | OUTPATIENT
Start: 2019-08-16 | End: 2019-10-11 | Stop reason: SDUPTHER

## 2019-08-19 ENCOUNTER — TELEPHONE (OUTPATIENT)
Dept: CARDIOLOGY | Facility: CLINIC | Age: 64
End: 2019-08-19

## 2019-08-19 NOTE — TELEPHONE ENCOUNTER
Patient called and states that torsemide 10 mg daily was not helping to get fluid off. He was having shortness of breath and was having trouble urinating. He states that on Saturday he went back to using Bumex 2 mg in the morning and 1 mg a night and that shortness of breath and urinating have improved. Has not noticed any weight gain.

## 2019-08-20 ENCOUNTER — HOSPITAL ENCOUNTER (OUTPATIENT)
Dept: CARDIOLOGY | Facility: HOSPITAL | Age: 64
Discharge: HOME | End: 2019-08-20

## 2019-08-20 ENCOUNTER — LAB (OUTPATIENT)
Dept: LAB | Facility: HOSPITAL | Age: 64
End: 2019-08-20

## 2019-08-20 DIAGNOSIS — I49.3 PVC (PREMATURE VENTRICULAR CONTRACTION): ICD-10-CM

## 2019-08-20 DIAGNOSIS — I51.9 LV DYSFUNCTION: ICD-10-CM

## 2019-08-20 DIAGNOSIS — R60.0 EDEMA LEG: ICD-10-CM

## 2019-08-20 DIAGNOSIS — I50.42 CHRONIC COMBINED SYSTOLIC AND DIASTOLIC CONGESTIVE HEART FAILURE, NYHA CLASS 3 (HCC): ICD-10-CM

## 2019-08-20 DIAGNOSIS — R06.02 SHORTNESS OF BREATH: ICD-10-CM

## 2019-08-20 LAB
ALBUMIN SERPL-MCNC: 4.18 G/DL (ref 3.5–5.2)
ALBUMIN/GLOB SERPL: 1.3 G/DL
ALP SERPL-CCNC: 83 U/L (ref 39–117)
ALT SERPL W P-5'-P-CCNC: 22 U/L (ref 1–41)
ANION GAP SERPL CALCULATED.3IONS-SCNC: 13.8 MMOL/L (ref 5–15)
AST SERPL-CCNC: 21 U/L (ref 1–40)
BASOPHILS # BLD AUTO: 0.02 10*3/MM3 (ref 0–0.2)
BASOPHILS NFR BLD AUTO: 0.3 % (ref 0–1.5)
BILIRUB SERPL-MCNC: 0.6 MG/DL (ref 0.2–1.2)
BUN BLD-MCNC: 15 MG/DL (ref 8–23)
BUN/CREAT SERPL: 15.5 (ref 7–25)
CALCIUM SPEC-SCNC: 9.7 MG/DL (ref 8.6–10.5)
CHLORIDE SERPL-SCNC: 91 MMOL/L (ref 98–107)
CO2 SERPL-SCNC: 27.2 MMOL/L (ref 22–29)
CREAT BLD-MCNC: 0.97 MG/DL (ref 0.76–1.27)
DEPRECATED RDW RBC AUTO: 44.1 FL (ref 37–54)
EOSINOPHIL # BLD AUTO: 0.1 10*3/MM3 (ref 0–0.4)
EOSINOPHIL NFR BLD AUTO: 1.3 % (ref 0.3–6.2)
ERYTHROCYTE [DISTWIDTH] IN BLOOD BY AUTOMATED COUNT: 13.5 % (ref 12.3–15.4)
GFR SERPL CREATININE-BSD FRML MDRD: 79 ML/MIN/1.73
GLOBULIN UR ELPH-MCNC: 3.3 GM/DL
GLUCOSE BLD-MCNC: 126 MG/DL (ref 65–99)
HCT VFR BLD AUTO: 45.1 % (ref 37.5–51)
HGB BLD-MCNC: 14.4 G/DL (ref 13–17.7)
IMM GRANULOCYTES # BLD AUTO: 0.01 10*3/MM3 (ref 0–0.05)
IMM GRANULOCYTES NFR BLD AUTO: 0.1 % (ref 0–0.5)
LYMPHOCYTES # BLD AUTO: 2.9 10*3/MM3 (ref 0.7–3.1)
LYMPHOCYTES NFR BLD AUTO: 36.6 % (ref 19.6–45.3)
MAXIMAL PREDICTED HEART RATE: 159 BPM
MCH RBC QN AUTO: 29.1 PG (ref 26.6–33)
MCHC RBC AUTO-ENTMCNC: 31.9 G/DL (ref 31.5–35.7)
MCV RBC AUTO: 91.3 FL (ref 79–97)
MONOCYTES # BLD AUTO: 0.6 10*3/MM3 (ref 0.1–0.9)
MONOCYTES NFR BLD AUTO: 7.6 % (ref 5–12)
NEUTROPHILS # BLD AUTO: 4.29 10*3/MM3 (ref 1.7–7)
NEUTROPHILS NFR BLD AUTO: 54.1 % (ref 42.7–76)
NT-PROBNP SERPL-MCNC: 1676 PG/ML (ref 5–900)
PLATELET # BLD AUTO: 197 10*3/MM3 (ref 140–450)
PMV BLD AUTO: 10.4 FL (ref 6–12)
POTASSIUM BLD-SCNC: 4.5 MMOL/L (ref 3.5–5.2)
PROT SERPL-MCNC: 7.5 G/DL (ref 6–8.5)
RBC # BLD AUTO: 4.94 10*6/MM3 (ref 4.14–5.8)
SODIUM BLD-SCNC: 132 MMOL/L (ref 136–145)
STRESS TARGET HR: 135 BPM
TSH SERPL DL<=0.05 MIU/L-ACNC: 0.76 MIU/ML (ref 0.27–4.2)
WBC NRBC COR # BLD: 7.92 10*3/MM3 (ref 3.4–10.8)

## 2019-08-20 PROCEDURE — 36415 COLL VENOUS BLD VENIPUNCTURE: CPT

## 2019-08-20 PROCEDURE — 84443 ASSAY THYROID STIM HORMONE: CPT | Performed by: NURSE PRACTITIONER

## 2019-08-20 PROCEDURE — 78481 HEART FIRST PASS SINGLE: CPT | Performed by: INTERNAL MEDICINE

## 2019-08-20 PROCEDURE — 0 TECHNETIUM LABELED RED BLOOD CELLS: Performed by: INTERNAL MEDICINE

## 2019-08-20 PROCEDURE — 80053 COMPREHEN METABOLIC PANEL: CPT | Performed by: NURSE PRACTITIONER

## 2019-08-20 PROCEDURE — 85025 COMPLETE CBC W/AUTO DIFF WBC: CPT | Performed by: NURSE PRACTITIONER

## 2019-08-20 PROCEDURE — 78481 HEART FIRST PASS SINGLE: CPT

## 2019-08-20 PROCEDURE — A9560 TC99M LABELED RBC: HCPCS | Performed by: INTERNAL MEDICINE

## 2019-08-20 PROCEDURE — 83880 ASSAY OF NATRIURETIC PEPTIDE: CPT | Performed by: NURSE PRACTITIONER

## 2019-08-20 RX ADMIN — TECHNETIUM TC 99M-LABELED RED BLOOD CELLS 1 DOSE: KIT at 14:53

## 2019-08-21 ENCOUNTER — OFFICE VISIT (OUTPATIENT)
Dept: CARDIOLOGY | Facility: CLINIC | Age: 64
End: 2019-08-21

## 2019-08-21 ENCOUNTER — TELEPHONE (OUTPATIENT)
Dept: CARDIOLOGY | Facility: CLINIC | Age: 64
End: 2019-08-21

## 2019-08-21 DIAGNOSIS — I25.9 IHD (ISCHEMIC HEART DISEASE): Primary | ICD-10-CM

## 2019-08-21 DIAGNOSIS — I51.9 LV DYSFUNCTION: ICD-10-CM

## 2019-08-21 PROCEDURE — 93284 PRGRMG EVAL IMPLANTABLE DFB: CPT | Performed by: INTERNAL MEDICINE

## 2019-08-21 RX ORDER — POTASSIUM CHLORIDE 750 MG/1
10 TABLET, FILM COATED, EXTENDED RELEASE ORAL DAILY
Qty: 30 TABLET | Refills: 5 | Status: SHIPPED | OUTPATIENT
Start: 2019-08-21 | End: 2019-11-12 | Stop reason: ALTCHOICE

## 2019-08-21 RX ORDER — SPIRONOLACTONE 50 MG/1
50 TABLET, FILM COATED ORAL DAILY
Qty: 30 TABLET | Refills: 5 | Status: SHIPPED | OUTPATIENT
Start: 2019-08-21 | End: 2019-11-12 | Stop reason: SDUPTHER

## 2019-08-21 NOTE — PROGRESS NOTES
EF still low at 14%.  Patient needs to be referred to  for heart failure clinic.  Will look when in the office for name.

## 2019-08-21 NOTE — TELEPHONE ENCOUNTER
Patient was made aware of increasing metoprolol 50 mg to 1 tablet in the morning and 1 tablet in the evening.   Patient was made aware of labs and MUGA results and also made aware to also increase spironolactone to 50 mg daily and decrease potassium to 10 mEq daily. Scripts for spironolactone and potassium sent to [facility].    I did mention to patient referring to heart failure clinic at . Patient states that he will have to think about it and let us know. I encouraged patient to think about and talk with wife about it and to let us know what he decides.    Patient is coming in next Wednesday (08/28/19) at 2 pm for EKG and blood pressure check.

## 2019-08-22 RX ORDER — BUMETANIDE 1 MG/1
1 TABLET ORAL NIGHTLY
Qty: 30 TABLET | Refills: 5 | Status: SHIPPED | OUTPATIENT
Start: 2019-08-22 | End: 2019-09-13 | Stop reason: SDUPTHER

## 2019-08-22 NOTE — TELEPHONE ENCOUNTER
Patient's wife, Caity, called and stated that patient needs refills on Bumex 1 mg nightly. With all the medication changes, I just wanted to double check with you that it is ok to refill?

## 2019-08-28 ENCOUNTER — CLINICAL SUPPORT (OUTPATIENT)
Dept: CARDIOLOGY | Facility: CLINIC | Age: 64
End: 2019-08-28

## 2019-08-28 ENCOUNTER — TELEPHONE (OUTPATIENT)
Dept: CARDIOLOGY | Facility: CLINIC | Age: 64
End: 2019-08-28

## 2019-08-28 VITALS — SYSTOLIC BLOOD PRESSURE: 88 MMHG | HEART RATE: 75 BPM | DIASTOLIC BLOOD PRESSURE: 62 MMHG

## 2019-08-28 DIAGNOSIS — I49.5 SSS (SICK SINUS SYNDROME) (HCC): Primary | ICD-10-CM

## 2019-08-28 PROCEDURE — 93000 ELECTROCARDIOGRAM COMPLETE: CPT | Performed by: NURSE PRACTITIONER

## 2019-08-28 RX ORDER — MAGNESIUM 200 MG
TABLET ORAL 2 TIMES DAILY
COMMUNITY

## 2019-08-28 NOTE — TELEPHONE ENCOUNTER
Please let Roberth know if Blood pressure is staying low, he may need to decrease evening dose of bumex to 0.5mg.  Is he drinking enough water also?  BP today 88/62.  EKG is okay.

## 2019-08-28 NOTE — TELEPHONE ENCOUNTER
Patient and patient's wife were made aware to decrease bumex to 0.5 mg in the evening if blood pressure is staying low. They were made aware to keep an eye on BP. She states that patient does drink plenty of water throughout the day.

## 2019-08-28 NOTE — PROGRESS NOTES
Procedure     ECG 12 Lead  Date/Time: 8/28/2019 3:30 PM  Performed by: Tracy Holloway APRN  Authorized by: Tracy Holloway APRN   Comparison: compared with previous ECG from 8/13/2019  Rhythm: paced  BPM: 75    Clinical impression: abnormal EKG  Comments: AV paced LBBB, normal QT

## 2019-08-29 ENCOUNTER — TELEPHONE (OUTPATIENT)
Dept: CARDIOLOGY | Facility: CLINIC | Age: 64
End: 2019-08-29

## 2019-08-30 ENCOUNTER — TELEPHONE (OUTPATIENT)
Dept: CARDIOLOGY | Facility: CLINIC | Age: 64
End: 2019-08-30

## 2019-08-30 DIAGNOSIS — I50.42 CHRONIC COMBINED SYSTOLIC AND DIASTOLIC CONGESTIVE HEART FAILURE, NYHA CLASS 3 (HCC): Primary | ICD-10-CM

## 2019-08-30 DIAGNOSIS — I51.9 LV DYSFUNCTION: ICD-10-CM

## 2019-09-05 ENCOUNTER — TELEPHONE (OUTPATIENT)
Dept: CARDIOLOGY | Facility: CLINIC | Age: 64
End: 2019-09-05

## 2019-09-05 DIAGNOSIS — I50.23 ACUTE ON CHRONIC SYSTOLIC CHF (CONGESTIVE HEART FAILURE) (HCC): Primary | ICD-10-CM

## 2019-09-05 NOTE — TELEPHONE ENCOUNTER
Patient called and reported that he has been having more swelling in his left foot and has gained 4 lbs. He states that he is not urinating well. He states that he took an extra 1 mg of bumex earlier today, but he hasn't noticed it helping.       He also states that he is interested in going to [facility]. Can you place referral?

## 2019-09-06 NOTE — TELEPHONE ENCOUNTER
Patient aware to increase evening dose of Bumex to 1 mg for a couple of days. Patient aware that when we set up appointment at , we will call and let him know.

## 2019-09-10 NOTE — TELEPHONE ENCOUNTER
Patient was made aware of appointment with Dr. Recinos that is on 10/01/19 at 12:30 pm.     Patient states that he has been taking bumex 2 mg in the morning and 2 tablets of the 1 mg at night. He states that it has helped with swelling and weight has been ok. We had decreased evening dose of bumex to 0.5 mg due to low BP. He states that at home his blood pressure has been around 90/70 and HR 71. How do you want him to proceed?

## 2019-09-12 NOTE — TELEPHONE ENCOUNTER
I was just wanting to clarify, are you wanting patient to see both EP and [facility]?

## 2019-09-13 ENCOUNTER — TELEPHONE (OUTPATIENT)
Dept: CARDIOLOGY | Facility: CLINIC | Age: 64
End: 2019-09-13

## 2019-09-13 RX ORDER — BUMETANIDE 1 MG/1
1 TABLET ORAL NIGHTLY
Qty: 30 TABLET | Refills: 5 | Status: SHIPPED | OUTPATIENT
Start: 2019-09-13 | End: 2019-11-12 | Stop reason: ALTCHOICE

## 2019-09-13 NOTE — TELEPHONE ENCOUNTER
Patient called and asked for script for bumex 1 mg daily to be sent to [facility].

## 2019-09-16 RX ORDER — ONDANSETRON 4 MG/1
TABLET, ORALLY DISINTEGRATING ORAL
Qty: 30 TABLET | Refills: 3 | OUTPATIENT
Start: 2019-09-16

## 2019-10-14 RX ORDER — NITROGLYCERIN 0.4 MG/1
TABLET SUBLINGUAL
Qty: 25 TABLET | Refills: 1 | Status: SHIPPED | OUTPATIENT
Start: 2019-10-14 | End: 2019-12-06 | Stop reason: SDUPTHER

## 2019-11-12 ENCOUNTER — OFFICE VISIT (OUTPATIENT)
Dept: CARDIOLOGY | Facility: CLINIC | Age: 64
End: 2019-11-12

## 2019-11-12 VITALS
BODY MASS INDEX: 34 KG/M2 | HEART RATE: 93 BPM | SYSTOLIC BLOOD PRESSURE: 100 MMHG | DIASTOLIC BLOOD PRESSURE: 70 MMHG | HEIGHT: 72 IN | WEIGHT: 251 LBS

## 2019-11-12 DIAGNOSIS — I25.118 CORONARY ARTERY DISEASE OF NATIVE ARTERY OF NATIVE HEART WITH STABLE ANGINA PECTORIS (HCC): Primary | ICD-10-CM

## 2019-11-12 DIAGNOSIS — I51.9 LV DYSFUNCTION: ICD-10-CM

## 2019-11-12 DIAGNOSIS — Z95.810 PRESENCE OF BIVENTRICULAR IMPLANTABLE CARDIOVERTER-DEFIBRILLATOR (ICD): ICD-10-CM

## 2019-11-12 DIAGNOSIS — I25.5 ISCHEMIC CARDIOMYOPATHY: ICD-10-CM

## 2019-11-12 DIAGNOSIS — E11.8 DM (DIABETES MELLITUS), TYPE 2 WITH COMPLICATIONS (HCC): ICD-10-CM

## 2019-11-12 DIAGNOSIS — I50.23 NYHA CLASS 4 ACUTE ON CHRONIC SYSTOLIC HEART FAILURE (HCC): ICD-10-CM

## 2019-11-12 DIAGNOSIS — I10 ESSENTIAL HYPERTENSION: ICD-10-CM

## 2019-11-12 DIAGNOSIS — E78.5 HYPERLIPIDEMIA LDL GOAL <70: ICD-10-CM

## 2019-11-12 PROCEDURE — 93000 ELECTROCARDIOGRAM COMPLETE: CPT | Performed by: NURSE PRACTITIONER

## 2019-11-12 PROCEDURE — 99214 OFFICE O/P EST MOD 30 MIN: CPT | Performed by: NURSE PRACTITIONER

## 2019-11-12 RX ORDER — BUMETANIDE 2 MG/1
2 TABLET ORAL DAILY
Qty: 90 TABLET | Refills: 3 | Status: SHIPPED | OUTPATIENT
Start: 2019-11-12

## 2019-11-12 RX ORDER — SPIRONOLACTONE 50 MG/1
50 TABLET, FILM COATED ORAL DAILY
Qty: 90 TABLET | Refills: 3 | Status: SHIPPED | OUTPATIENT
Start: 2019-11-12

## 2019-11-12 RX ORDER — BUMETANIDE 2 MG/1
2 TABLET ORAL DAILY
COMMUNITY
End: 2019-11-12 | Stop reason: SDUPTHER

## 2019-11-12 NOTE — PROGRESS NOTES
Chief Complaint   Patient presents with   • Follow-up     Cardiac management. Patient was recently transfered to  due to CHF exacerbation, has had cardiac workup at , see discharge summary and last follow up visit note from .   • Edema     Still having swelling in feet and legs, has increased the last couple weeks.   • Shortness of Breath     Has started increasing for the last 2 weeks.    • Chest Pain     Having occasional light chest pains across chest.   • Pacemaker Check     Last [facility] check 8/21/19   • Med Refill     Needs refills on Bumex and Spironolactone-90 day.     Subjective       Roberth Kearns is a 64 y.o. male with HTN, hyperlipidemia, paroxysmal VT/PVC, IHD s/p bypass surgery x 2 and multiple stents, LV dysfunction who has decompensated to NYHA class 4 HF.  On 1/25/2019 he presented to [facility] with CP and SOB. EKG showed ST depression and cardiac enzymes were elevated. Cardiac cath showed patent LIMA to LAD, patent SVG to OM, and 100% occlusion of VG to PDA not amendable to PCI, and diminished LV function. Medication management advised including aspirin, Lipitor, Entresto, Aldactone, continue sotalol, and continue Effient. He was diuresed with Bumex, LifeVest placed then later readmitted with HF, EF remained low at 21-25%. Multiple medication changes made and follow up MUGA showed a drop in LV function to 11% and BiV/ICD advised. He then underwent [facility] Bi-V pacer upgrade with Dr. Diamond on 7/8/2019. He returned to [facility] with CHF exacerbation in October and was transferred to  for further management. Echo 10/10/19 EF 20-25%, BNP improved. PICC line placed, milrinone initiated. He was evaluated for LVAD or transplant. Work up included CT chest- stable; carotid US- <50% bilateral stenosis; MARIAM- normal; Abd US- normal; EGD/colonoscopy- stable, dental work up- required; PFT- FEV1/FVC 71%, A1C 6.4%, H/H 12.6/38.9%. TSH 0.87. Effient d/c'd given plan for possible VAD/TXP. Smoking cessation  1/2019 but restarted with no smoking for last 2-4 weeks. Today he came for follow up. Overall, feels stable. SOB is chronic but stable. Weight is stable. No worsening anginal symptoms. BP stable with Toprol 50 mg QD, Entresto 97/103 mg BID, spironolactone 50 mg QD, Bumex 2 mg QD with extra dose as needed.     HPI         Cardiac History:    Past Surgical History:   Procedure Laterality Date   • CARDIAC CATHETERIZATION  06/18/2007    Cath-Same Findings. PTCA of OM.   • CARDIAC CATHETERIZATION  08/26/2004    Cath-Patent Grafts-85%LCX at OM[facility].   • CARDIAC CATHETERIZATION  01/2002    Cath-Brachy Therapy of OM, Stent in RCA.   • CARDIAC CATHETERIZATION  06/2001    Cath-[place] in OM   • CARDIAC CATHETERIZATION  03/25/2016    90% SVG to AM- 2.5x9 & 2.25x12 ALEX. 80% SVG to OM- 3.5x8 ALEX. 100% SVG to PDA   • CARDIAC CATHETERIZATION  10/27/2017    Patent Grafts and stents. Diffuse distal Disease   • CARDIAC CATHETERIZATION  01/25/2019    Patent LIMA to LAD, SVG to OM. 100% SVG to PDA. EF 25-30%   • CARDIOVASCULAR STRESS TEST  07/22/2008    Stress- 6Min, 72%THR. Anterol Lateral Infarct with [place] Infarct Ischemia.   • CARDIOVASCULAR STRESS TEST  01/24/2013    L. Myoview-EF 40%. AnteroLateral Infarct with PerInfarct Ischemia.   • CARDIOVASCULAR STRESS TEST  09/22/2015    Lexiscan stress-mod fixed defect   • CARDIOVASCULAR STRESS TEST  10/10/2017    L.Myoview- EF 55%. Anterior Infarct with Ischemia. Small Inferior Ischemia.   • CATH LAB PROCEDURE  05/10/2013    Cath-90% SVG to OM-5.0 x 8 mm BMS.   • CONVERTED (HISTORICAL) HOLTER  06/28/2007    Holter-AVG HR 63 bpm, tow (3beats) runs of V-tach.   • CONVERTED (HISTORICAL) HOLTER  07/15/2013    Holter-AVGHR 56 BPM. 2 runs of V-tach.   • CONVERTED (HISTORICAL) HOLTER  04/16/2019    AVG HR 62 BPM.48-80 BPM. 101% PVC   • CONVERTED (HISTORICAL) HOLTER  04/11/2019    Baseline NSR, 10% PVC   • CORONARY ARTERY BYPASS GRAFT  04/2001    CABG-LIMA to LAD, SVG to RCA.   •  CORONARY ARTERY BYPASS GRAFT  09/18/2002    Redo CABG-SVG to distal OM, SVG to PDA.   • ECHO - CONVERTED  06/18/2007    Echo-EF 45, Lateral WMA.   • ECHO - CONVERTED  11/03/2008    Echo-EF 45-49% Trace MR and MVP.   • ECHO - CONVERTED  01/24/2013    Echo-EF 45%   • ECHO - CONVERTED  09/23/2014    Echo-([facility]) EF 50%. Septal WMA.   • ECHO - CONVERTED  09/22/2015    Echo-EF 45-50%, RVSP 15 mmHg, mod MR.   • ECHO - CONVERTED  03/25/2016    EF 40%   • ECHO - CONVERTED  10/10/2017    EF 45-50%.Anterior WMA   • ECHO - CONVERTED  01/25/2019    @ [facility]. TLS. EF 35%. Mod MR.   • ECHO - CONVERTED  04/18/2019    @ [facility]. EF 25%. Inferior WMA. LA-5.0 Cm. RVSP- 37 mmHg.   • ECHO - CONVERTED  04/29/2019    Limited Echo. EF 20-25%. LA- 5.3 Cm   • OTHER SURGICAL HISTORY  06/12/2019    MUGA- LVEF 11%.   • OTHER SURGICAL HISTORY  07/08/2019    Upgrade ICD to BI-V   • OTHER SURGICAL HISTORY  05/09/2019    ICD implant   • OTHER SURGICAL HISTORY  08/20/2019    MUGA. LVEF- 14%. RVEF- 13%.     Current Outpatient Medications   Medication Sig Dispense Refill   • albuterol sulfate  (90 Base) MCG/ACT inhaler Inhale 2 puffs Every 4 (Four) Hours As Needed for Wheezing.     • Arginine 500 MG capsule Take 1,000 mg by mouth 2 (Two) Times a Day.     • aspirin 81 MG EC tablet Take 81 mg by mouth daily. Decrease dose to once a day with Effient     • atorvastatin (LIPITOR) 80 MG tablet Take 80 mg by mouth Daily.     • bumetanide (BUMEX) 2 MG tablet Take 1 tablet by mouth Daily. May take extra tablet at night PRN. 90 tablet 3   • cholecalciferol (VITAMIN D3) 1000 units tablet Take 1,000 Units by mouth Daily.     • ezetimibe (ZETIA) 10 MG tablet Take 1 tablet by mouth Daily. 30 tablet 5   • fluticasone (FLOVENT HFA) 44 MCG/ACT inhaler Inhale 1 puff 2 (Two) Times a Day.     • gabapentin (NEURONTIN) 400 MG capsule Take 400 mg by mouth 3 (Three) Times a Day.     • HYDROcodone-acetaminophen (NORCO)  MG per tablet Take 1 tablet by mouth Every 4  (Four) Hours As Needed for Moderate Pain .     • levothyroxine (SYNTHROID, LEVOTHROID) 75 MCG tablet Take 1 tablet by mouth Daily. 30 tablet 5   • Magnesium 200 MG tablet Take  by mouth 2 (Two) Times a Day.     • metFORMIN (GLUCOPHAGE) 500 MG tablet Take 500 mg by mouth Daily With Breakfast.     • Metoprolol Succinate 50 MG capsule extended-release 24 hour sprinkle Take 50 mg by mouth 2 (Two) Times a Day. One tablet in AM and 1/2 PM (Patient taking differently: Take 50 mg by mouth Daily.) 180 capsule 2   • MILRINONE LACTATE IV Infuse  into a venous catheter.     • nitroglycerin (NITROSTAT) 0.4 MG SL tablet PLACE 1 TABLET UNDER TONGUE FOR CHEST PAIN MAY REPEAT EVERY 5 MINUTES IF NOT RELIEVED IN 15 MINUTES GO TO EMERGENCY ROOM 25 tablet 1   • Omega-3 Fatty Acids (FISH OIL) 1000 MG capsule capsule Take  by mouth 2 (two) times a day.     • ondansetron ODT (ZOFRAN ODT) 4 MG disintegrating tablet Take 1 tablet by mouth 3 (Three) Times a Day As Needed for Nausea or Vomiting. 21 tablet 0   • pantoprazole (PROTONIX) 40 MG EC tablet Take 40 mg by mouth Daily.     • sacubitril-valsartan (ENTRESTO)  MG tablet Take 1 tablet by mouth 2 (Two) Times a Day.     • spironolactone (ALDACTONE) 50 MG tablet Take 1 tablet by mouth Daily. 90 tablet 3     No current facility-administered medications for this visit.      Patient has no known allergies.    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   Substance and Sexual Activity   • Alcohol use: No   • Drug use: No     Family History   Problem Relation Age of Onset   • Hypertension Mother    • Cancer Father    • Heart disease Other    • Hyperlipidemia Other    • Hypertension Other    • Hypertension Other      Review of Systems   Constitution: Positive for weakness, malaise/fatigue and weight loss (down 6 lb in 3 months). Negative for diaphoresis.   HENT: Negative.    Eyes: Negative.    Cardiovascular: Positive for chest pain, dyspnea on exertion, leg swelling (mild ) and orthopnea. Negative for palpitations and syncope.   Respiratory: Positive for shortness of breath. Negative for cough.    Endocrine: Negative.    Hematologic/Lymphatic: Negative.    Skin: Negative.    Musculoskeletal: Negative for falls and myalgias.   Gastrointestinal: Negative for abdominal pain, melena and nausea.   Genitourinary: Negative for hematuria.   Neurological: Negative for dizziness.   Psychiatric/Behavioral: Positive for depression (regarding diagnosis). Negative for altered mental status.   Allergic/Immunologic: Negative.       Diabetes- Yes  Thyroid-normal    Objective     /70 (BP Location: Left arm)   Pulse 93   Ht 182.9 cm (72.01\")   Wt 114 kg (251 lb)   BMI 34.03 kg/m²     Physical Exam   Constitutional: He is oriented to person, place, and time. He appears well-developed and well-nourished. No distress.   HENT:   Head: Normocephalic and atraumatic.   Eyes: Pupils are equal, round, and reactive to light.   Neck: Normal range of motion.   Cardiovascular: Normal rate and regular rhythm.   Murmur heard.  Pulmonary/Chest: Effort normal and breath sounds normal. No respiratory distress. He has no rales.   Abdominal: Soft. " Bowel sounds are normal. He exhibits no distension.   Musculoskeletal: Normal range of motion. He exhibits edema (trace).   Neurological: He is alert and oriented to person, place, and time.   Skin: Skin is warm and dry. He is not diaphoretic. There is pallor.   Psychiatric: He has a normal mood and affect.   Nursing note and vitals reviewed.       ECG 12 Lead  Date/Time: 11/15/2019 8:44 AM  Performed by: Gretchen Dudley APRN  Authorized by: Gretchen Dudley APRN   Comparison: compared with previous ECG from 8/28/2019  Comparison to previous ECG: AV paced on last EKG  Rhythm: paced  Ectopy: unifocal PVCs  Rate: normal  BPM: 93    Clinical impression: abnormal EKG  Comments: Intermittent V paced with PVC   ms   ms  QTc 480 ms                Assessment/Plan      Roberth was seen today for follow-up, edema, shortness of breath, chest pain, pacemaker check and med refill.    Diagnoses and all orders for this visit:    Coronary artery disease of native artery of native heart with stable angina pectoris (CMS/[facility])    LV dysfunction    Essential hypertension    Ischemic cardiomyopathy    Hyperlipidemia LDL goal <70    NYHA class 4 acute on chronic systolic heart failure (CMS/HCC)  -     bumetanide (BUMEX) 2 MG tablet; Take 1 tablet by mouth Daily. May take extra tablet at night PRN.  -     spironolactone (ALDACTONE) 50 MG tablet; Take 1 tablet by mouth Daily.    DM (diabetes mellitus), type 2 with complications (CMS/[facility])    Other orders  -     ECG 12 Lead    1. CAD- CABG x 2, 2001, 2002, STEMI, 1/25/19, cardiac cath- patent LIMA to LAD, patent SVG to OM, 100% occluded VG to OM not amendable to PCI. Medical management.     2. LV dysfunction- EF 20-25%, 10/10/19, NYHA class 4 HF, maximized medical therapy with Entresto 97/103 mg BID, metoprolol, spironolactone, Bumex, BiV/ICD placed. Referred to  for further management. PICC line placed for IV milrinone while evaluated for LVAD or transplant. Patient to follow up  with [facility] in two weeks. Euvolemic at this time, weight stable, dyspnea stable, BP stable. No changes made today. Extra dose Bumex PRN for worsening dyspnea, weight gain. Limit Na, limit fluid intake, daily weight.     3. HTN- stable, without hypotension. Continue current plan.    4. Hyperlipidemia- well controlled with high-intensity statin, Lipitor 80 mg and Zetia.     5. DM- stable with A1C 6.4%.     6. BiV/ICD placement- will schedule him for [facility] device check.     Return for follow up in three months. Contact office or  heart failure team for worsening symptoms.     Patient's Body mass index is 34.03 kg/m². BMI is above normal parameters. Recommendations include: nutrition counseling.              Electronically signed by MIGUELITO Bryant,  November 15, 2019 10:05 AM

## 2019-11-15 PROBLEM — E78.5 HYPERLIPIDEMIA LDL GOAL <70: Status: ACTIVE | Noted: 2019-11-15

## 2019-11-15 PROBLEM — I50.23 NYHA CLASS 4 ACUTE ON CHRONIC SYSTOLIC HEART FAILURE (HCC): Status: ACTIVE | Noted: 2019-11-15

## 2019-11-15 PROBLEM — I25.5 ISCHEMIC CARDIOMYOPATHY: Status: ACTIVE | Noted: 2019-11-15

## 2019-11-15 PROBLEM — E11.8 DM (DIABETES MELLITUS), TYPE 2 WITH COMPLICATIONS (HCC): Status: ACTIVE | Noted: 2019-11-15

## 2019-12-06 RX ORDER — NITROGLYCERIN 0.4 MG/1
TABLET SUBLINGUAL
Qty: 25 TABLET | Refills: 1 | Status: SHIPPED | OUTPATIENT
Start: 2019-12-06

## 2020-01-09 ENCOUNTER — TELEPHONE (OUTPATIENT)
Dept: CARDIOLOGY | Facility: CLINIC | Age: 65
End: 2020-01-09

## 2020-01-09 NOTE — TELEPHONE ENCOUNTER
I received a letter from [facility] stating that Ranexa is not covered and asking to change to generic, but I cannot find where he has been prescribed this.    Can you confirm for me if patient is to be taking Ranexa 1000 mg?  (Your name was on the letter)

## 2020-01-29 RX ORDER — RANOLAZINE 1000 MG/1
TABLET, EXTENDED RELEASE ORAL
Qty: 60 TABLET | Refills: 6 | OUTPATIENT
Start: 2020-01-29

## 2021-02-13 NOTE — TELEPHONE ENCOUNTER
ED Time Seen By Provider Entered On:  1/15/2020 14:21     Performed On:  1/15/2020 14:21  by Murtaza Castellon MD               Time Seen By Provider   Time Seen by Provider :   1/15/2020 14:21    Murtaza Castellon MD - 1/15/2020 14:21                Electronically Signed On 01.15.2020 14:21  ___________________________________________________   Murtaza Castellon MD     Okay. Weight okay. Must take with food.

## 2021-11-30 NOTE — PROGRESS NOTES
This is the gentleman I asked about changing sotalol to amiodarone to with CHF.  His BNP is 1699.  Do you want to change it?  And if so, Amio 200 BID? See HPI

## 2024-09-03 NOTE — PROGRESS NOTES
Chief Complaint   Patient presents with   • Hospital Follow up     For cardiac management. Patient is on aspirin. Reports that he had been taking Ranexa 500 mg BID, but started having chest pain so he started taking 1000 mg BID and states that chest pain has eased off. Reports that he has had some shortness of breath. Reports that he does have palpitations. Patient was in [facility] and had echo, in chart. Will attempt to obtain labs. Reports that sometimes he feels like he can't breathe. O2 was checked and it was 99% RA. Reports dizziness occasionally.    • Med Refill     Needs refills on cardiac medications. 30 day supplies to [facility].       Cardiac Complaints  chest pressure/discomfort and dyspnea      Subjective   Roberth Kearns is a 63 y.o. male with HTN, hyperlipidemia, paroxysmal VT/PVCs, and ischemic heart disease diagnosed in 2001 when he underwent bypass surgery followed by redo bypass surgery and multiple stentings. At follow up visit in January 2017, he continued to have chest discomfort and Ranexa was increased to 1000 mg BID and he was advised to undergo repeat stress testing.  He had some problems with insurance and was unable to complete testing. In November 2017, he continued to report chest pain relieved with NTG.  Cardiac workup with stress and echo advised.  Stress showed anterior infarct with ischemia and cath was advised. Diffuse distal disease was found with medical management advised as he had no target vessels to put the stents. In November 2018, he did report continued chest pain and imdur was added with ranexa dosing increased. On 1/25/2019 he presented to [facility] emergency room with complaints of chest pain and shortness of breath.  EKG showed ST depression and cardiac enzymes were elevated.  He underwent cardiac catheterization that showed patent LIMA to LAD, patent saphenous vein graft to obtuse marginal, and 100% occlusion of his vein graft to PDA not  amendable to PCI, and diminished LV function.  Medication management was advised including aspirin, Lipitor, Entresto, Aldactone, continue sotalol, and continue Effient.  Due to heart failure he was diuresed with Bumex.  A LifeVest was placed with plan for AICD with no EF improvement. Multiple medication changes then were made as hypotension became a concern.  Norvasc was steadily tapered.  He then later had a holter placed in April for palpitations that showed significant percentage of PVCs, totaling 10%.  Sotalol was continued.  He was readmitted to the hospital on 4/18/2019 for exacerbation of CHF and was diuresed, treated with inhalers, and urged to limit sodium intake.  90 day post cath echo on 4/29/2019 showed EF remained low at 21-25%. Labs done at [facility] showed AIC 6.1%, BNP 1575, HDL 43, LDL 47, TSH 0.59, , K 3.6, Creatinine 1.2, BUN 18, and H/H 12.8/37.4.    He returns today for follow up and continues to have shortness of breath and inability to catch a deep breath.  Patient describes it as almost a gasping for air.  His oxygen was checked and read as 99% RA.  He also reports continued chest pain that he describes as sharp in nature but states this has eased off since being in the hospital.  He denies any edema and states his weight has been well controlled.  Cardiac refills requested.        Cardiac History  Past Surgical History:   Procedure Laterality Date   • CARDIAC CATHETERIZATION  06/18/2007    Cath-Same Findings. PTCA of .   • CARDIAC CATHETERIZATION  08/26/2004    Cath-Patent Grafts-85%LCX at [facility].   • CARDIAC CATHETERIZATION  01/2002    Cath-Brachy Therapy of , Stent in RCA.   • CARDIAC CATHETERIZATION  06/2001    Cath-[place] in    • CARDIAC CATHETERIZATION  03/25/2016    90% SVG to AM- 2.5x9 & 2.25x12 ALEX. 80% SVG to OM- 3.5x8 ALEX. 100% SVG to PDA   • CARDIAC CATHETERIZATION  10/27/2017    Patent Grafts and stents. Diffuse distal Disease   • CARDIAC CATHETERIZATION   01/25/2019    Patent LIMA to LAD, SVG to OM. 100% SVG to PDA. EF 25-30%   • CARDIOVASCULAR STRESS TEST  07/22/2008    Stress- 6Min, 72%THR. Anterol Lateral Infarct with [place] Infarct Ischemia.   • CARDIOVASCULAR STRESS TEST  01/24/2013    L. Myoview-EF 40%. AnteroLateral Infarct with PerInfarct Ischemia.   • CARDIOVASCULAR STRESS TEST  09/22/2015    Lexiscan stress-mod fixed defect   • CARDIOVASCULAR STRESS TEST  10/10/2017    L.Myoview- EF 55%. Anterior Infarct with Ischemia. Small Inferior Ischemia.   • CATH LAB PROCEDURE  05/10/2013    Cath-90% SVG to OM-5.0 x 8 mm BMS.   • CONVERTED (HISTORICAL) HOLTER  06/28/2007    Holter-AVG HR 63 bpm, tow (3beats) runs of V-tach.   • CONVERTED (HISTORICAL) HOLTER  07/15/2013    Holter-AVGHR 56 BPM. 2 runs of V-tach.   • CONVERTED (HISTORICAL) HOLTER  04/16/2019    AVG HR 62 BPM.48-80 BPM. 101% PVC   • CONVERTED (HISTORICAL) HOLTER  04/11/2019    Baseline NSR, 10% PVC   • CORONARY ARTERY BYPASS GRAFT  04/2001    CABG-LIMA to LAD, SVG to RCA.   • CORONARY ARTERY BYPASS GRAFT  09/18/2002    Redo CABG-SVG to distal OM, SVG to PDA.   • ECHO - CONVERTED  06/18/2007    Echo-EF 45, Lateral WMA.   • ECHO - CONVERTED  11/03/2008    Echo-EF 45-49% Trace MR and MVP.   • ECHO - CONVERTED  01/24/2013    Echo-EF 45%   • ECHO - CONVERTED  09/23/2014    Echo-([facility]) EF 50%. Septal WMA.   • ECHO - CONVERTED  09/22/2015    Echo-EF 45-50%, RVSP 15 mmHg, mod MR.   • ECHO - CONVERTED  03/25/2016    EF 40%   • ECHO - CONVERTED  10/10/2017    EF 45-50%.Anterior WMA   • ECHO - CONVERTED  01/25/2019    @ [facility]. TLS. EF 35%. Mod MR.   • ECHO - CONVERTED  04/18/2019    @ [facility]. EF 25%. Inferior WMA. LA-5.0 Cm. RVSP- 37 mmHg.   • ECHO - CONVERTED  04/29/2019    Limited Echo. EF 20-25%. LA- 5.3 Cm       Current Outpatient Medications   Medication Sig Dispense Refill   • albuterol sulfate  (90 Base) MCG/ACT inhaler Inhale 2 puffs Every 4 (Four) Hours As Needed for Wheezing.     • amLODIPine (NORVASC) 5  Transplant Surgery MG tablet Take 1 tablet by mouth Daily. 30 tablet 3   • Arginine 500 MG capsule Take 1,000 mg by mouth 2 (Two) Times a Day.     • aspirin 81 MG EC tablet Take 81 mg by mouth daily. Decrease dose to once a day with Effient     • atorvastatin (LIPITOR) 80 MG tablet Take 80 mg by mouth Daily.     • bumetanide (BUMEX) 2 MG tablet Take 2 mg by mouth Daily.     • cholecalciferol (VITAMIN D3) 1000 units tablet Take 1,000 Units by mouth 2 (Two) Times a Day.     • ezetimibe (ZETIA) 10 MG tablet Take 1 tablet by mouth Daily. 30 tablet 5   • fluticasone (FLOVENT HFA) 44 MCG/ACT inhaler Inhale 1 puff 2 (Two) Times a Day.     • gabapentin (NEURONTIN) 400 MG capsule Take 400 mg by mouth 3 (Three) Times a Day.     • HYDROcodone-acetaminophen (NORCO)  MG per tablet Take 1 tablet by mouth Every 4 (Four) Hours As Needed for Moderate Pain .     • levothyroxine (SYNTHROID, LEVOTHROID) 75 MCG tablet Take 1 tablet by mouth Daily. 30 tablet 5   • metFORMIN (GLUCOPHAGE) 500 MG tablet Take 500 mg by mouth Daily With Breakfast.     • Niacin, Antihyperlipidemic, (NIACIN ER, ANTIHYPERLIPIDEMIC, PO) Take 500 mg by mouth Daily.     • nitroglycerin (NITROSTAT) 0.4 MG SL tablet PLACE 1 TABLET UNDER TONGUE FOR CHEST PAIN MAY REPEAT EVERY 5 MINUTES. TAKE NO MORE THAN 3 DOSES IN 15 MINUTES 25 tablet 1   • Omega-3 Fatty Acids (FISH OIL) 1000 MG capsule capsule Take  by mouth 2 (two) times a day.     • pantoprazole (PROTONIX) 40 MG EC tablet Take 40 mg by mouth Daily.     • potassium chloride (K-DUR,KLOR-CON) 20 MEQ CR tablet Take 1 tablet by mouth Daily. 30 tablet 6   • prasugrel (EFFIENT) 10 MG tablet Take 1 tablet by mouth Daily. 30 tablet 6   • ranolazine (RANEXA) 1000 MG 12 hr tablet Take 1,000 mg by mouth 2 (Two) Times a Day.     • sacubitril-valsartan (ENTRESTO) 49-51 MG tablet Take 1 tablet by mouth 2 (Two) Times a Day. 60 tablet 3   • sotalol (BETAPACE) 80 MG tablet Take 1 tablet by mouth 2 (Two) Times a Day. 60 tablet 6   •  spironolactone (ALDACTONE) 25 MG tablet Take 1 tablet by mouth Daily. 30 tablet 6     No current facility-administered medications for this visit.        Patient has no known allergies.    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   Substance and Sexual Activity   • Alcohol use: No   • Drug use: No       Family History   Problem Relation Age of Onset   • Hypertension Mother    • Cancer Father    • Heart disease Other    • Hyperlipidemia Other    • Hypertension Other    • Hypertension Other        Review of Systems   Constitution: Negative for weakness and malaise/fatigue.   Cardiovascular: Positive for chest pain and dyspnea on exertion. Negative for claudication, irregular heartbeat, leg swelling, near-syncope, palpitations and syncope.   Respiratory: Positive for shortness of breath. Negative for cough and wheezing.    Skin: Negative for dry skin and rash.   Musculoskeletal: Negative for back pain, joint pain and joint swelling.   Gastrointestinal: Negative for anorexia and heartburn.   Genitourinary: Negative for dysuria, hematuria, hesitancy and nocturia.   Neurological: Negative for dizziness, light-headedness and loss of balance.  "  Psychiatric/Behavioral: Negative for depression and memory loss. The patient is nervous/anxious.            Objective     BP 94/60 (BP Location: Left arm)   Pulse 64   Ht 182.9 cm (72.01\")   Wt 112 kg (247 lb)   BMI 33.49 kg/m²     Physical Exam   Constitutional: He is oriented to person, place, and time. He appears well-developed and well-nourished.   HENT:   Head: Normocephalic and atraumatic.   Eyes: EOM are normal. Pupils are equal, round, and reactive to light.   Neck: Normal range of motion. Neck supple.   Cardiovascular: Normal rate. A regularly irregular rhythm present.   Murmur heard.  Pulmonary/Chest: Effort normal. He has wheezes.   Abdominal: Soft.   Musculoskeletal: Normal range of motion.   Neurological: He is alert and oriented to person, place, and time.   Skin: Skin is warm and dry.   Psychiatric: His mood appears anxious.         ECG 12 Lead  Date/Time: 5/2/2019 4:51 PM  Performed by: Tracy Holloway APRN  Authorized by: Tracy Holloway APRN   Rhythm: sinus rhythm  Ectopy: unifocal PVCs  BPM: 64  Conduction: 1st degree AV block    Clinical impression: abnormal EKG            Assessment/Plan     HR is stable today and regularly irregular.  EKG done today for history of PVC managed with sotalol therapy shows NSR with first degree block and normal QT.  Same dosing continued.  BNP will be drawn to assess for any acute systolic failure.  If level is normal, sotalol will be continued.  If abnormal, sotalol will be discontinued in favor of amiodarone therapy.  DAPT therapy continued for IHD and tolerance reported.  Bleeding and bruising are denied. HTN is well managed but low today and patient has been having some dizziness/weakness.  He will advised to discontinue his norvasc therapy.  Blood pressure log encouraged.  Entresto therapy continued at current dosing along with aldactone and bumex for systolic class III failure.  Refills sent.  Referral sent to Dr. Diamond for [facility] ICD placement " as EF has not improved 90 day post cath and remains at 20%.  Patient urged to continue use of life vest until ICD placed to avoid sudden death from lethal arrhythmia.  Patient reports understanding.  Patient denies any swelling on current bumex therapy and checks weight daily every morning.  Patient denies any increase of over 3 pounds in a day.  He knows to call if so, continued limitation in sodium recommended.  For hyperlipidemia management, he will continue on zetia, fish oil, and lipitor in regards as most recent FLP at goal.  Refills of cardiac meds sent per request.  Patient has remained successful in smoking cessation and was praised for his efforts.  BMI noted at 33.49 but weight is down from 11 pounds from prior. Good cardiac ADA diet with limited caloric intake, carbs, and activity as tolerated advised.  6 month follow up recommended or sooner if needed.         Problems Addressed this Visit        Cardiovascular and Mediastinum    LV dysfunction    Relevant Medications    ranolazine (RANEXA) 1000 MG 12 hr tablet    Other Relevant Orders    Ambulatory Referral to Cardiology (Completed)    HTN (hypertension)    Relevant Medications    bumetanide (BUMEX) 2 MG tablet    IHD (ischemic heart disease)    Relevant Medications    ranolazine (RANEXA) 1000 MG 12 hr tablet      Other Visit Diagnoses     Acute systolic CHF (congestive heart failure) (CMS/[facility])    -  Primary    Relevant Medications    ranolazine (RANEXA) 1000 MG 12 hr tablet    Other Relevant Orders    Ambulatory Referral to Cardiology (Completed)    PVC (premature ventricular contraction)        Relevant Medications    ranolazine (RANEXA) 1000 MG 12 hr tablet    Other Relevant Orders    ECG 12 Lead    Long term current use of antiarrhythmic drug        Chest discomfort        Shortness of breath        Hyperlipidemia LDL goal <70        Relevant Medications    atorvastatin (LIPITOR) 80 MG tablet    Type 2 diabetes mellitus without complication,  without long-term current use of insulin (CMS/[facility])        Obesity (BMI 30.0-34.9)        Former cigarette smoker        Anxiety              Patient's Body mass index is 33.49 kg/m². BMI is above normal parameters. Recommendations include: nutrition counseling.                Electronically signed by MIGUELITO Herrmann May 3, 2019 12:42 PM